# Patient Record
Sex: MALE | Race: WHITE | NOT HISPANIC OR LATINO | Employment: OTHER | URBAN - METROPOLITAN AREA
[De-identification: names, ages, dates, MRNs, and addresses within clinical notes are randomized per-mention and may not be internally consistent; named-entity substitution may affect disease eponyms.]

---

## 2019-05-04 RX ORDER — GABAPENTIN 800 MG/1
TABLET ORAL
Qty: 270 TABLET | Refills: 3 | Status: SHIPPED | OUTPATIENT
Start: 2019-05-04 | End: 2020-05-12 | Stop reason: SDUPTHER

## 2019-07-10 ENCOUNTER — TELEPHONE (OUTPATIENT)
Dept: FAMILY MEDICINE | Facility: CLINIC | Age: 74
End: 2019-07-10

## 2019-07-10 ENCOUNTER — OFFICE VISIT (OUTPATIENT)
Dept: FAMILY MEDICINE | Facility: CLINIC | Age: 74
End: 2019-07-10
Payer: MEDICARE

## 2019-07-10 VITALS
SYSTOLIC BLOOD PRESSURE: 104 MMHG | TEMPERATURE: 98 F | HEART RATE: 66 BPM | DIASTOLIC BLOOD PRESSURE: 65 MMHG | WEIGHT: 238 LBS | OXYGEN SATURATION: 98 %

## 2019-07-10 DIAGNOSIS — E78.5 HYPERLIPIDEMIA, UNSPECIFIED HYPERLIPIDEMIA TYPE: ICD-10-CM

## 2019-07-10 DIAGNOSIS — R63.8 DEHYDRATION SYMPTOMS: Primary | ICD-10-CM

## 2019-07-10 DIAGNOSIS — E11.40 TYPE 2 DIABETES MELLITUS WITH DIABETIC NEUROPATHY, WITHOUT LONG-TERM CURRENT USE OF INSULIN: ICD-10-CM

## 2019-07-10 DIAGNOSIS — M62.82 NON-TRAUMATIC RHABDOMYOLYSIS: Primary | ICD-10-CM

## 2019-07-10 PROBLEM — K64.9 HEMORRHOIDS: Status: ACTIVE | Noted: 2019-07-10

## 2019-07-10 PROBLEM — F41.9 ANXIETY: Status: ACTIVE | Noted: 2019-07-10

## 2019-07-10 PROBLEM — G60.9 IDIOPATHIC PERIPHERAL NEUROPATHY: Status: ACTIVE | Noted: 2019-07-10

## 2019-07-10 PROBLEM — I10 BENIGN ESSENTIAL HYPERTENSION: Status: ACTIVE | Noted: 2019-07-10

## 2019-07-10 PROBLEM — R35.1 NOCTURIA: Status: ACTIVE | Noted: 2019-07-10

## 2019-07-10 LAB
ABS NRBC COUNT: 0 X 10 3/UL (ref 0–0.01)
ABSOLUTE BASOPHIL: 0.03 X 10 3/UL (ref 0–0.22)
ABSOLUTE EOSINOPHIL: 0.13 X 10 3/UL (ref 0.04–0.54)
ABSOLUTE IMMATURE GRAN: 0.01 X 10 3/UL (ref 0–0.04)
ABSOLUTE LYMPHOCYTE: 2.51 X 10 3/UL (ref 0.86–4.75)
ABSOLUTE MONOCYTE: 0.6 X 10 3/UL (ref 0.22–1.08)
ALBUMIN SERPL-MCNC: 4.7 G/DL (ref 3.5–5.2)
ALBUMIN/GLOB SERPL ELPH: 1.6 {RATIO} (ref 1–2.7)
ALP ISOS SERPL LEV INH-CCNC: 71 IU/L (ref 40–130)
ALT (SGPT): 23 U/L (ref 0–41)
ANION GAP SERPL CALC-SCNC: 10 MMOL/L (ref 8–17)
AST SERPL-CCNC: 30 U/L (ref 0–40)
BASOPHILS NFR BLD: 0.5 %
BILIRUBIN, TOTAL: 0.72 MG/DL (ref 0–1.2)
BUN/CREAT SERPL: 25.4 (ref 6–20)
CALCIUM SERPL-MCNC: 9.3 MG/DL (ref 8.6–10.2)
CARBON DIOXIDE, CO2: 28 MMOL/L (ref 22–29)
CHLORIDE: 105 MMOL/L (ref 98–107)
CHOLEST SERPL-MSCNC: 133 MG/DL (ref 100–200)
CK SERPL-CCNC: 628 U/L (ref 39–308)
CREAT SERPL-MCNC: 1.18 MG/DL (ref 0.7–1.2)
EOSINOPHIL NFR BLD: 2.1 %
ESTIMATED AVERAGE GLUCOSE: 127 MG/DL
GFR ESTIMATION: 60.68
GLOBULIN: 3 G/DL (ref 1.5–4.5)
GLUCOSE: 116 MG/DL (ref 82–115)
HBA1C MFR BLD: 6.1 % (ref 4–6)
HCT VFR BLD AUTO: 42.5 % (ref 42–52)
HDLC SERPL-MCNC: 35 MG/DL
HGB BLD-MCNC: 14.5 G/DL (ref 14–18)
IMMATURE GRANULOCYTES: 0.2 % (ref 0–0.5)
LDL/HDL RATIO: 1.9 (ref 1–3)
LDLC SERPL CALC-MCNC: 67.2 MG/DL (ref 0–100)
LYMPHOCYTES NFR BLD: 41.1 %
MCH RBC QN AUTO: 31.7 PG (ref 27–32)
MCHC RBC AUTO-ENTMCNC: 34.1 G/DL (ref 32–36)
MCV RBC AUTO: 93 FL (ref 80–94)
MONOCYTES NFR BLD: 9.8 %
NEUTROPHILS ABSOLUTE COUNT: 2.83 X 10 3/UL (ref 2.15–7.56)
NEUTROPHILS NFR BLD: 46.3 %
NUCLEATED RED BLOOD CELLS: 0 /100 WBC (ref 0–0.2)
PLATELET # BLD AUTO: 236 X 10 3/UL (ref 135–400)
POTASSIUM: 4.3 MMOL/L (ref 3.5–5.1)
PROT SNV-MCNC: 7.7 G/DL (ref 6.4–8.3)
RBC # BLD AUTO: 4.57 X 10 6/UL (ref 4.7–6.1)
RDW-SD: 42.2 FL (ref 37–54)
SODIUM: 143 MMOL/L (ref 136–145)
TRIGL SERPL-MCNC: 154 MG/DL (ref 0–150)
UREA NITROGEN (BUN): 30 MG/DL (ref 8–23)
WBC # BLD: 6.11 X 10 3/UL (ref 4.3–10.8)

## 2019-07-10 PROCEDURE — 99213 PR OFFICE/OUTPT VISIT, EST, LEVL III, 20-29 MIN: ICD-10-PCS | Mod: S$GLB,,, | Performed by: NURSE PRACTITIONER

## 2019-07-10 PROCEDURE — 99213 OFFICE O/P EST LOW 20 MIN: CPT | Mod: S$GLB,,, | Performed by: NURSE PRACTITIONER

## 2019-07-10 RX ORDER — FLUOXETINE HYDROCHLORIDE 40 MG/1
CAPSULE ORAL
COMMUNITY
End: 2019-11-18 | Stop reason: SDUPTHER

## 2019-07-10 RX ORDER — ROPINIROLE 1 MG/1
TABLET, FILM COATED ORAL
COMMUNITY
End: 2019-09-14 | Stop reason: SDUPTHER

## 2019-07-10 RX ORDER — TAMSULOSIN HYDROCHLORIDE 0.4 MG/1
0.4 CAPSULE ORAL DAILY
COMMUNITY
End: 2019-07-18 | Stop reason: SDUPTHER

## 2019-07-10 RX ORDER — NAPROXEN SODIUM 220 MG/1
TABLET, FILM COATED ORAL
COMMUNITY

## 2019-07-10 RX ORDER — PRAVASTATIN SODIUM 80 MG/1
TABLET ORAL
COMMUNITY
End: 2019-08-06 | Stop reason: SDUPTHER

## 2019-07-10 RX ORDER — LOSARTAN POTASSIUM AND HYDROCHLOROTHIAZIDE 12.5; 5 MG/1; MG/1
TABLET ORAL
COMMUNITY
End: 2020-06-17 | Stop reason: ALTCHOICE

## 2019-07-10 RX ORDER — GLIMEPIRIDE 1 MG/1
TABLET ORAL
COMMUNITY
End: 2019-08-12 | Stop reason: SDUPTHER

## 2019-07-10 NOTE — TELEPHONE ENCOUNTER
Spoke with patient via telephone. Notified of abnormal lab results and cause. Instructed to hold statin. Encouraged oral hydration and rest. Repeat Ck prior to next appointmen on 07/18/19. Follow up as scheduled with Dr. Harris.    Critical :

## 2019-07-10 NOTE — PATIENT INSTRUCTIONS
Dehydration (Adult)  Dehydration occurs when your body loses too much fluid. This may be the result of prolonged vomiting or diarrhea, excessive sweating, or a high fever. It may also happen if you dont drink enough fluid when youre sick or out in the heat. Misuse of diuretics (water pills) can also be a cause.  Symptoms include thirst and decreased urine output. You may also feel dizzy, weak, fatigued, or very drowsy. The diet described below is usually enough to treat dehydration. In some cases, you may need medicine.  Home care  · Drink at least 12 8-ounce glasses of fluid every day to resolve the dehydration. Fluid may include water; orange juice; lemonade; apple, grape, or cranberry juice; clear fruit drinks; electrolyte replacement and sports drinks; and teas and coffee without caffeine. If you have been diagnosed with a kidney disease, ask your doctor how much and what types of fluids you should drink to prevent dehydration. If you have kidney disease, fluid can build up in the body. This can be dangerous to your health.  · If you have a fever, muscle aches, or a headache as a result of a cold or flu, you may take acetaminophen or ibuprofen, unless another medicine was prescribed. If you have chronic liver or kidney disease, or have ever had a stomach ulcer or gastrointestinal bleeding, talk with your health care provider before using these medicines. Don't take aspirin if you are younger than 18 and have a fever. Aspirin raises the chance for severe liver injury.  Follow-up care  Follow up with your health care provider, or as advised.  When to seek medical advice  Call your health care provider right away if any of these occur:  · Continued vomiting  · Frequent diarrhea (more than 5 times a day); blood (red or black color) or mucus in diarrhea  · Blood in vomit or stool  · Swollen abdomen or increasing abdominal pain  · Weakness, dizziness, or fainting  · Unusual drowsiness or confusion  · Reduced urine  output or extreme thirst  · Fever of 100.4°F (34°C) or higher  Date Last Reviewed: 5/31/2015  © 3035-1208 [employer]. [address] [place] [address], [place] [address]. All rights reserved. This information is not intended as a substitute for professional medical care. Always follow your healthcare professional's instructions.

## 2019-07-10 NOTE — PROGRESS NOTES
"Clinic Note  7/10/2019      Subjective:       Patient ID:  Maurice is a 74 y.o. male being seen for an established visit.    Chief Complaint: Loss of Consciousness (Pt c/o "blacking out" states that he loss all vision)    HPI   Maurice Sosa is a 74 y.o. male who presents for evaluation of near syncope. Onset was 2 days ago, Monday 07/08/19. States he has been outside all day in the heat working on [employer] to prepare [employer] for next season. States he remembers urinating only once on Monday when the first episode occurred. He experience one other less severe episode on Tuesday, 07/09/19. Patient describes the episode as never actually lost consciousness, had precursor symptoms only, including feeling of almost losing consciousness, greyed out vision. Associated symptoms: short of breath and intermittent muscular cramping of bilateral lower extermities. States that it feels as though he can not take a deep breath. The patient denies pain, fever,  nausea, vomiting, and tachycardia/palpitations. Medications putting patient at risk for syncope: diuretics, ARB, pravastatin.      Review of Systems   Constitutional: Positive for malaise/fatigue. Negative for chills, diaphoresis and fever.   HENT: Negative for congestion, ear discharge, ear pain and sinus pain.    Respiratory: Positive for shortness of breath. Negative for cough, sputum production and wheezing.    Cardiovascular: Negative for chest pain, palpitations and claudication.   Gastrointestinal: Negative for constipation, diarrhea, nausea and vomiting.   Genitourinary: Positive for frequency. Negative for dysuria and urgency.        Nocturia, chronic, stable with meds   Musculoskeletal: Positive for myalgias.         Intermittent muscular cramping of bilateral lower extremities x2 days   Neurological: Positive for dizziness and loss of consciousness.        Near syncopal episode x2 days       Medication List with Changes/Refills   Current Medications    " ASPIRIN 81 MG CHEW    aspirin 81 mg chewable tablet   Chew 1 tablet every day by oral route.    FLUOXETINE 40 MG CAPSULE    fluoxetine 40 mg capsule   TAKE 1 CAPSULE EVERY DAY    GABAPENTIN (NEURONTIN) 800 MG TABLET    TAKE 1 TABLET THREE TIMES DAILY    GLIMEPIRIDE (AMARYL) 1 MG TABLET    glimepiride 1 mg tablet   TAKE 1 TABLET EVERY DAY    LOSARTAN-HYDROCHLOROTHIAZIDE 50-12.5 MG (HYZAAR) 50-12.5 MG PER TABLET    losartan 50 mg-hydrochlorothiazide 12.5 mg tablet   TAKE 1 TABLET EVERY DAY    PRAVASTATIN (PRAVACHOL) 80 MG TABLET    pravastatin 80 mg tablet   TAKE 1 TABLET EVERY DAY    ROPINIROLE (REQUIP) 1 MG TABLET    ropinirole 1 mg tablet   TAKE 1 TABLET EVERY DAY AT BEDTIME    TAMSULOSIN (FLOMAX) 0.4 MG CAP    Take 0.4 mg by mouth once daily.       Patient Active Problem List   Diagnosis    Diabetic neuropathy    Benign essential hypertension    Hyperlipidemia    Hemorrhoids    Idiopathic peripheral neuropathy    Nocturia    Anxiety           Objective:      /65 (BP Location: Left arm, Patient Position: Sitting, BP Method: Large (Automatic))   Pulse 66   Temp 97.5 °F (36.4 °C) (Temporal)   Wt 108 kg (238 lb)   SpO2 98%   There is no height or weight on file to calculate BMI.         Physical Exam   Constitutional: He is oriented to person, place, and time and well-developed, well-nourished, and in no distress. No distress.   HENT:   Head: Normocephalic and atraumatic.   Left Ear: External ear normal.   Nose: Nose normal.   Mouth/Throat: Oropharynx is clear and moist.   Eyes: Conjunctivae and EOM are normal. Left eye exhibits no discharge. No scleral icterus.   Neck: Normal range of motion. Neck supple. No JVD present. No tracheal deviation present.   Cardiovascular: Normal rate, regular rhythm, normal heart sounds and intact distal pulses. Exam reveals no friction rub.   No murmur heard.  Musculoskeletal: Normal range of motion. He exhibits no edema, tenderness or deformity.   Neurological: He is  alert and oriented to person, place, and time. Gait normal. GCS score is 15.   Skin: Skin is warm. No rash noted. He is not diaphoretic. No erythema. No pallor.   Psychiatric: Mood, memory, affect and judgment normal.            Assessment and Plan:   Dehydration symptoms     Patient reassured of benign history and exam.  Lab per orders.  Encourage oral hydration: water, Gatorade, powerade   Call if s/s worsen or do not improve  Follow up with Dr. Harris as scheduled and as needed.      Problem List Items Addressed This Visit        Cardiac/Vascular    Hyperlipidemia    Relevant Orders    Lipid panel      Other Visit Diagnoses     Dehydration symptoms    -  Primary    Relevant Orders    Comprehensive metabolic panel    CK    CBC auto differential    Type 2 diabetes mellitus with diabetic neuropathy, without long-term current use of insulin        Relevant Medications    glimepiride (AMARYL) 1 MG tablet    Other Relevant Orders    Hemoglobin A1c          Follow Up:   Follow up if symptoms worsen or fail to improve.    Other Orders Placed This Visit:  Orders Placed This Encounter   Procedures    Comprehensive metabolic panel    CK    CBC auto differential    Hemoglobin A1c    Lipid panel         Cris Ricardo    Problem List Items Addressed This Visit        Cardiac/Vascular    Hyperlipidemia    Relevant Orders    Lipid panel      Other Visit Diagnoses     Dehydration symptoms    -  Primary    Relevant Orders    Comprehensive metabolic panel    CK    CBC auto differential    Type 2 diabetes mellitus with diabetic neuropathy, without long-term current use of insulin        Relevant Medications    glimepiride (AMARYL) 1 MG tablet    Other Relevant Orders    Hemoglobin A1c

## 2019-07-18 ENCOUNTER — OFFICE VISIT (OUTPATIENT)
Dept: FAMILY MEDICINE | Facility: CLINIC | Age: 74
End: 2019-07-18
Payer: MEDICARE

## 2019-07-18 VITALS
OXYGEN SATURATION: 97 % | TEMPERATURE: 98 F | DIASTOLIC BLOOD PRESSURE: 64 MMHG | SYSTOLIC BLOOD PRESSURE: 98 MMHG | HEART RATE: 62 BPM | WEIGHT: 242 LBS

## 2019-07-18 DIAGNOSIS — I10 BENIGN ESSENTIAL HYPERTENSION: ICD-10-CM

## 2019-07-18 DIAGNOSIS — E78.5 HYPERLIPIDEMIA, UNSPECIFIED HYPERLIPIDEMIA TYPE: ICD-10-CM

## 2019-07-18 DIAGNOSIS — E11.40 TYPE 2 DIABETES MELLITUS WITH DIABETIC NEUROPATHY, WITHOUT LONG-TERM CURRENT USE OF INSULIN: ICD-10-CM

## 2019-07-18 DIAGNOSIS — M62.82 NON-TRAUMATIC RHABDOMYOLYSIS: ICD-10-CM

## 2019-07-18 DIAGNOSIS — R42 DIZZINESS: Primary | ICD-10-CM

## 2019-07-18 LAB — CK SERPL-CCNC: 330 U/L (ref 39–308)

## 2019-07-18 PROCEDURE — 99214 OFFICE O/P EST MOD 30 MIN: CPT | Mod: S$GLB,,, | Performed by: FAMILY MEDICINE

## 2019-07-18 PROCEDURE — 99214 PR OFFICE/OUTPT VISIT, EST, LEVL IV, 30-39 MIN: ICD-10-PCS | Mod: S$GLB,,, | Performed by: FAMILY MEDICINE

## 2019-07-18 RX ORDER — TAMSULOSIN HYDROCHLORIDE 0.4 MG/1
0.4 CAPSULE ORAL DAILY
Qty: 90 CAPSULE | Refills: 3 | Status: SHIPPED | OUTPATIENT
Start: 2019-07-18 | End: 2020-06-17

## 2019-07-19 NOTE — PROGRESS NOTES
Subjective:       Patient ID: Maurice Sosa is a 74 y.o. male.    Chief Complaint: Follow-up    75 yo male in for follow up on diabetes, hypertension, anxiety, hyperlipidemia and BPH.  He states that he has been feeling dizzy but not as bad as before.  He came in complaining of feeling weak and having a lot of muscle pain and his CPK was high.  He states he is still feeling off and weak as if he will pass out and short of breath.      Review of Systems   Constitutional: Negative for fever.   HENT: Negative for ear pain, postnasal drip, rhinorrhea, sinus pain and sore throat.    Eyes: Negative for redness.   Respiratory: Negative for cough, chest tightness, shortness of breath and wheezing.    Cardiovascular: Negative for chest pain, palpitations and leg swelling.   Gastrointestinal: Negative for constipation, diarrhea, nausea and vomiting.   Genitourinary: Negative for difficulty urinating and dysuria.   Musculoskeletal: Negative for arthralgias.   Skin: Negative for rash.   Neurological: Positive for dizziness, weakness and light-headedness.       Objective:      Physical Exam   Constitutional: He is oriented to person, place, and time. He appears well-developed and well-nourished.   HENT:   Head: Normocephalic and atraumatic.   Eyes: Pupils are equal, round, and reactive to light. Conjunctivae and EOM are normal.   Neck: Normal range of motion. Neck supple.   Cardiovascular: Normal rate, regular rhythm and normal heart sounds.   Pulmonary/Chest: Breath sounds normal. He has no wheezes. He has no rales.   Abdominal: Soft. Bowel sounds are normal. He exhibits no distension and no mass. There is no tenderness. There is no guarding.   Musculoskeletal: Normal range of motion. He exhibits no edema or tenderness.   Neurological: He is alert and oriented to person, place, and time. No cranial nerve deficit.   Skin: Skin is warm and dry. No rash noted. No erythema.   Psychiatric: He has a normal mood and affect. His  behavior is normal.   Nursing note and vitals reviewed.      Assessment:       1. Dizziness    2. Non-traumatic rhabdomyolysis    3. Hyperlipidemia, unspecified hyperlipidemia type    4. Type 2 diabetes mellitus with diabetic neuropathy, without long-term current use of insulin    5. Benign essential hypertension        Plan:     Dizziness still present but not as bad as before but I will check an EKG today and revealed no significant changes.  CPK leval has come down to 330 from over 600's.  Patient to decreased the pravastatin to 40 mg and repeat CPK level in 3-4 weeks.  Diabetes is chronic on glimepiride.  Hypertension is chronic and controlled on losartan -hctz but may need to monitor blood pressure closely to monitor for orthostatic hypotension.

## 2019-08-06 RX ORDER — PRAVASTATIN SODIUM 80 MG/1
TABLET ORAL
Qty: 90 TABLET | Refills: 3 | Status: SHIPPED | OUTPATIENT
Start: 2019-08-06 | End: 2021-03-11 | Stop reason: DRUGHIGH

## 2019-08-12 RX ORDER — GLIMEPIRIDE 1 MG/1
TABLET ORAL
Qty: 90 TABLET | Refills: 3 | Status: SHIPPED | OUTPATIENT
Start: 2019-08-12 | End: 2020-07-13 | Stop reason: SDUPTHER

## 2019-08-29 ENCOUNTER — OFFICE VISIT (OUTPATIENT)
Dept: FAMILY MEDICINE | Facility: CLINIC | Age: 74
End: 2019-08-29
Payer: MEDICARE

## 2019-08-29 VITALS
BODY MASS INDEX: 33.65 KG/M2 | WEIGHT: 240.38 LBS | HEART RATE: 65 BPM | TEMPERATURE: 99 F | HEIGHT: 71 IN | DIASTOLIC BLOOD PRESSURE: 71 MMHG | OXYGEN SATURATION: 98 % | SYSTOLIC BLOOD PRESSURE: 113 MMHG

## 2019-08-29 DIAGNOSIS — E11.40 TYPE 2 DIABETES MELLITUS WITH DIABETIC NEUROPATHY, WITHOUT LONG-TERM CURRENT USE OF INSULIN: ICD-10-CM

## 2019-08-29 DIAGNOSIS — I10 BENIGN ESSENTIAL HYPERTENSION: ICD-10-CM

## 2019-08-29 DIAGNOSIS — E78.00 PURE HYPERCHOLESTEROLEMIA: ICD-10-CM

## 2019-08-29 DIAGNOSIS — E78.5 HYPERLIPIDEMIA, UNSPECIFIED HYPERLIPIDEMIA TYPE: Primary | ICD-10-CM

## 2019-08-29 PROCEDURE — 99213 OFFICE O/P EST LOW 20 MIN: CPT | Mod: S$GLB,,, | Performed by: FAMILY MEDICINE

## 2019-08-29 PROCEDURE — 99213 PR OFFICE/OUTPT VISIT, EST, LEVL III, 20-29 MIN: ICD-10-PCS | Mod: S$GLB,,, | Performed by: FAMILY MEDICINE

## 2019-08-29 NOTE — ASSESSMENT & PLAN NOTE
Repeat lipid panel today and if control he will continue to take 40 mg of pravastatin at this time.

## 2019-08-29 NOTE — PROGRESS NOTES
Clinic Note  8/29/2019      Subjective:       Patient ID:  Maurice is a 74 y.o. male being seen for an established visit.    Chief Complaint: Follow-up (dizzness)        Maurice Sosa is a 74 y.o. male here for follow up of dyslipidemia. The patient does use medications that may worsen dyslipidemias (corticosteroids, progestins, anabolic steroids, diuretics, beta-blockers, amiodarone, cyclosporine, olanzapine). The patient exercises intermittently.  The patient is not known to have coexisting coronary artery disease. He came in a couple months ago with complaint of excruciating muscle pain and feeling weak and a CPK level was checked and noted to be elevated.  Because of a concern for myositis due to statin usage his cholesterol medicine was decreased to 40 mg of pravastatin.  He did go to lab this morning to have his cholesterol checked again.  He reports that he has not had any symptoms since be cutting his medication down to 40 mg.      Hypertension: Patient here for follow-up of elevated blood pressure. He is not exercising and is adherent to low salt diet.  Blood pressure is well controlled at home. Cardiac symptoms none. Patient denies chest pain, dyspnea, near-syncope and palpitations.  Cardiovascular risk factors: diabetes mellitus, dyslipidemia, hypertension, male gender and obesity (BMI >= 30 kg/m2). Use of agents associated with hypertension: none. History of target organ damage: none.    Diabetes Mellitus  Patient presents for follow up of diabetes. Current symptoms include: none. Symptoms have been well-controlled and been basically asymptomatic. Patient denies foot ulcerations, hyperglycemia and hypoglycemia . Evaluation to date has included: hemoglobin A1C.  Home sugars: BGs consistently in an acceptable range. Current treatment: no recent interventions.           Review of Systems   Constitutional: Negative for malaise/fatigue and weight loss.   HENT: Negative.  Negative for sinus pain.    Eyes:  "Negative.    Respiratory: Negative.  Negative for cough, shortness of breath, wheezing and stridor.    Cardiovascular: Negative.  Negative for chest pain, palpitations and PND.   Gastrointestinal: Positive for diarrhea. Negative for abdominal pain.   Genitourinary: Negative.  Negative for dysuria and urgency.   Musculoskeletal: Negative.  Negative for back pain and myalgias.   Skin: Negative.    Neurological: Negative.  Negative for dizziness and weakness.   Endo/Heme/Allergies: Negative.    Psychiatric/Behavioral: Negative.        Medication List with Changes/Refills   Current Medications    ASPIRIN 81 MG CHEW    aspirin 81 mg chewable tablet   Chew 1 tablet every day by oral route.    FLUOXETINE 40 MG CAPSULE    fluoxetine 40 mg capsule   TAKE 1 CAPSULE EVERY DAY    GABAPENTIN (NEURONTIN) 800 MG TABLET    TAKE 1 TABLET THREE TIMES DAILY    GLIMEPIRIDE (AMARYL) 1 MG TABLET    TAKE 1 TABLET EVERY DAY    LOSARTAN-HYDROCHLOROTHIAZIDE 50-12.5 MG (HYZAAR) 50-12.5 MG PER TABLET    losartan 50 mg-hydrochlorothiazide 12.5 mg tablet   TAKE 1 TABLET EVERY DAY    PRAVASTATIN (PRAVACHOL) 80 MG TABLET    TAKE 1 TABLET EVERY DAY    ROPINIROLE (REQUIP) 1 MG TABLET    ropinirole 1 mg tablet   TAKE 1 TABLET EVERY DAY AT BEDTIME    TAMSULOSIN (FLOMAX) 0.4 MG CAP    Take 1 capsule (0.4 mg total) by mouth once daily.       Patient Active Problem List   Diagnosis    Diabetic neuropathy    Benign essential hypertension    Hyperlipidemia    Hemorrhoids    Idiopathic peripheral neuropathy    Nocturia    Anxiety    Non-traumatic rhabdomyolysis    Dizziness    Type 2 diabetes mellitus with diabetic neuropathy, without long-term current use of insulin           Objective:      /71 (BP Location: Left arm, Patient Position: Sitting, BP Method: Large (Automatic))   Pulse 65   Temp 99.2 °F (37.3 °C)   Ht 5' 11" (1.803 m)   Wt 109 kg (240 lb 6 oz)   SpO2 98%   BMI 33.53 kg/m²   Estimated body mass index is 33.53 kg/m² as " "calculated from the following:    Height as of this encounter: 5' 11" (1.803 m).    Weight as of this encounter: 109 kg (240 lb 6 oz).  Physical Exam   Constitutional: He is oriented to person, place, and time and well-developed, well-nourished, and in no distress.   HENT:   Head: Normocephalic and atraumatic.   Cardiovascular: Normal rate, regular rhythm and normal heart sounds.   Pulmonary/Chest: Effort normal and breath sounds normal.   Abdominal: Soft. Bowel sounds are normal.   Musculoskeletal: Normal range of motion.   Neurological: He is alert and oriented to person, place, and time.   Nursing note and vitals reviewed.        Assessment and Plan:         Problem List Items Addressed This Visit        Cardiac/Vascular    Hyperlipidemia - Primary    Current Assessment & Plan       Repeat lipid panel today and if control he will continue to take 40 mg of pravastatin at this time.         Relevant Orders    Lipid panel    CK    Benign essential hypertension    Current Assessment & Plan       Hypertension is chronic and well controlled on current meds.         Relevant Orders    Hemoglobin A1c    Comprehensive metabolic panel    CBC auto differential    TSH       Endocrine    Type 2 diabetes mellitus with diabetic neuropathy, without long-term current use of insulin    Relevant Orders    Hemoglobin A1c          Follow Up:   Follow up in about 3 months (around 11/29/2019).    Other Orders Placed This Visit:        Eugenia Harris       "

## 2019-08-30 LAB
CHOLEST SERPL-MSCNC: 136 MG/DL (ref 100–200)
HDLC SERPL-MCNC: 37 MG/DL
LDL/HDL RATIO: 1.9 (ref 1–3)
LDLC SERPL CALC-MCNC: 71.4 MG/DL (ref 0–100)
TRIGL SERPL-MCNC: 138 MG/DL (ref 0–150)

## 2019-09-15 RX ORDER — ROPINIROLE 1 MG/1
TABLET, FILM COATED ORAL
Qty: 90 TABLET | Refills: 3 | Status: SHIPPED | OUTPATIENT
Start: 2019-09-15 | End: 2020-10-15 | Stop reason: SDUPTHER

## 2019-09-18 ENCOUNTER — TELEPHONE (OUTPATIENT)
Dept: FAMILY MEDICINE | Facility: CLINIC | Age: 74
End: 2019-09-18

## 2019-09-18 DIAGNOSIS — R07.1 CHEST PAIN ON BREATHING: ICD-10-CM

## 2019-09-18 DIAGNOSIS — R55 NEAR SYNCOPE: Primary | ICD-10-CM

## 2019-09-18 NOTE — TELEPHONE ENCOUNTER
----- Message from Miranda Correa sent at 9/18/2019  9:13 AM CDT -----  Contact: patient  Went to ER on Monday. Had another fainting episode had to call the ambulance would like a referral to DR Lc Castano

## 2019-10-08 RX ORDER — ISOPROPYL ALCOHOL 0.75 G/1
SWAB TOPICAL
Qty: 100 EACH | Refills: 3 | Status: SHIPPED | OUTPATIENT
Start: 2019-10-08

## 2019-11-24 RX ORDER — FLUOXETINE HYDROCHLORIDE 40 MG/1
CAPSULE ORAL
Qty: 90 CAPSULE | Refills: 3 | Status: SHIPPED | OUTPATIENT
Start: 2019-11-24 | End: 2019-11-25 | Stop reason: SDUPTHER

## 2019-11-25 NOTE — TELEPHONE ENCOUNTER
----- Message from Rowan Villatoro sent at 11/25/2019  2:12 PM CST -----  Refill request    Fluoxetine 40mg    [facility]

## 2019-11-26 RX ORDER — FLUOXETINE HYDROCHLORIDE 40 MG/1
40 CAPSULE ORAL DAILY
Qty: 90 CAPSULE | Refills: 3 | Status: SHIPPED | OUTPATIENT
Start: 2019-11-26 | End: 2021-02-24 | Stop reason: SDUPTHER

## 2019-12-17 ENCOUNTER — OFFICE VISIT (OUTPATIENT)
Dept: FAMILY MEDICINE | Facility: CLINIC | Age: 74
End: 2019-12-17
Payer: MEDICARE

## 2019-12-17 VITALS
HEIGHT: 71 IN | SYSTOLIC BLOOD PRESSURE: 161 MMHG | OXYGEN SATURATION: 98 % | HEART RATE: 63 BPM | DIASTOLIC BLOOD PRESSURE: 84 MMHG | WEIGHT: 238 LBS | BODY MASS INDEX: 33.32 KG/M2 | TEMPERATURE: 98 F

## 2019-12-17 DIAGNOSIS — E78.00 PURE HYPERCHOLESTEROLEMIA: ICD-10-CM

## 2019-12-17 DIAGNOSIS — E11.40 TYPE 2 DIABETES MELLITUS WITH DIABETIC NEUROPATHY, WITHOUT LONG-TERM CURRENT USE OF INSULIN: ICD-10-CM

## 2019-12-17 DIAGNOSIS — J01.10 SUBACUTE FRONTAL SINUSITIS: ICD-10-CM

## 2019-12-17 DIAGNOSIS — I10 ESSENTIAL HYPERTENSION: ICD-10-CM

## 2019-12-17 PROCEDURE — 1159F PR MEDICATION LIST DOCUMENTED IN MEDICAL RECORD: ICD-10-PCS | Mod: S$GLB,,, | Performed by: FAMILY MEDICINE

## 2019-12-17 PROCEDURE — 96372 PR INJECTION,THERAP/PROPH/DIAG2ST, IM OR SUBCUT: ICD-10-PCS | Mod: S$GLB,,, | Performed by: FAMILY MEDICINE

## 2019-12-17 PROCEDURE — 96372 THER/PROPH/DIAG INJ SC/IM: CPT | Mod: S$GLB,,, | Performed by: FAMILY MEDICINE

## 2019-12-17 PROCEDURE — 1159F MED LIST DOCD IN RCRD: CPT | Mod: S$GLB,,, | Performed by: FAMILY MEDICINE

## 2019-12-17 PROCEDURE — 99214 PR OFFICE/OUTPT VISIT, EST, LEVL IV, 30-39 MIN: ICD-10-PCS | Mod: 25,S$GLB,, | Performed by: FAMILY MEDICINE

## 2019-12-17 PROCEDURE — 99214 OFFICE O/P EST MOD 30 MIN: CPT | Mod: 25,S$GLB,, | Performed by: FAMILY MEDICINE

## 2019-12-17 RX ORDER — BETAMETHASONE SODIUM PHOSPHATE AND BETAMETHASONE ACETATE 3; 3 MG/ML; MG/ML
6 INJECTION, SUSPENSION INTRA-ARTICULAR; INTRALESIONAL; INTRAMUSCULAR; SOFT TISSUE
Status: COMPLETED | OUTPATIENT
Start: 2019-12-17 | End: 2019-12-17

## 2019-12-17 RX ADMIN — BETAMETHASONE SODIUM PHOSPHATE AND BETAMETHASONE ACETATE 6 MG: 3; 3 INJECTION, SUSPENSION INTRA-ARTICULAR; INTRALESIONAL; INTRAMUSCULAR; SOFT TISSUE at 03:12

## 2019-12-17 NOTE — PROGRESS NOTES
Subjective:      Patient ID: Maurice Sosa is a 74 y.o. male.    Chief Complaint: Follow-up and Hyperlipidemia      Maurice Sosa is a 74 y.o. male here for follow up of dyslipidemia. The patient does use medications that may worsen dyslipidemias (corticosteroids, progestins, anabolic steroids, diuretics, beta-blockers, amiodarone, cyclosporine, olanzapine). The patient exercises infrequently.  The patient is known to have coexisting coronary artery disease.     Cardiac Risk Factors  Age > 45-male, > 55-female:  YES  +1  Smoking:   NO  Sig. family hx of CHD*:  YES  +1  Hypertension:   YES  +1  Diabetes:   YES  +1  HDL < 35:   YES  +1  HDL > 59:   NO  Total: 5    *- Sig. family h/o CHD per NCEP = MI or sudden death at <56yo in   father or other 1st-degree male relative, or <64yo in mother or   other 1st-degree female relative    Diabetes Mellitus  Patient presents for follow up of diabetes. Current symptoms include: none. Symptoms have been well-controlled. Patient denies foot ulcerations, hyperglycemia, hypoglycemia , increased appetite, nausea, paresthesia of the feet, polydipsia, polyuria, visual disturbances and vomiting. Evaluation to date has included: hemoglobin A1C.  Home sugars: BGs consistently in an acceptable range. Current treatment: no recent interventions.     Hypertension: Patient here for follow-up of elevated blood pressure. He is exercising and is adherent to low salt diet.  Blood pressure is well controlled at home. Cardiac symptoms none. Patient denies chest pain, dyspnea, palpitations and syncope.  Cardiovascular risk factors: advanced age (older than 55 for men, 65 for women), diabetes mellitus, dyslipidemia, hypertension and male gender. Use of agents associated with hypertension: none.  Patient was experiencing dizziness and a near syncopal episode and was seen by cardiology and he had a negative workup done on his heart.  He was told to stop the losartan-hctz. He has also been with  "sinus congestion and taking pseudoephedrine for it.     Review of Systems   Constitutional: Negative for fever.   HENT: Negative for ear pain, postnasal drip, rhinorrhea, sinus pain and sore throat.    Eyes: Negative for redness.   Respiratory: Negative for cough, chest tightness, shortness of breath and wheezing.    Cardiovascular: Negative for chest pain, palpitations and leg swelling.   Gastrointestinal: Negative for constipation, diarrhea, nausea and vomiting.   Genitourinary: Negative for difficulty urinating and dysuria.   Musculoskeletal: Negative for arthralgias.   Skin: Negative for rash.   Neurological: Negative for dizziness.     Medication List with Changes/Refills   Current Medications    ASPIRIN 81 MG CHEW    aspirin 81 mg chewable tablet   Chew 1 tablet every day by oral route.    BD ALCOHOL SWABS PADM    USE WHEN CHECKING BLOOD SUGAR ONE TIME DAILY    FLUOXETINE 40 MG CAPSULE    Take 1 capsule (40 mg total) by mouth once daily.    GABAPENTIN (NEURONTIN) 800 MG TABLET    TAKE 1 TABLET THREE TIMES DAILY    GLIMEPIRIDE (AMARYL) 1 MG TABLET    TAKE 1 TABLET EVERY DAY    LOSARTAN-HYDROCHLOROTHIAZIDE 50-12.5 MG (HYZAAR) 50-12.5 MG PER TABLET    losartan 50 mg-hydrochlorothiazide 12.5 mg tablet   TAKE 1 TABLET EVERY DAY    PRAVASTATIN (PRAVACHOL) 80 MG TABLET    TAKE 1 TABLET EVERY DAY    ROPINIROLE (REQUIP) 1 MG TABLET    TAKE 1 TABLET AT BEDTIME    TAMSULOSIN (FLOMAX) 0.4 MG CAP    Take 1 capsule (0.4 mg total) by mouth once daily.      Objective:   BP (!) 161/84 (BP Location: Left arm, Patient Position: Sitting, BP Method: Large (Automatic))   Pulse 63   Temp 98.1 °F (36.7 °C)   Ht 5' 11" (1.803 m)   Wt 108 kg (238 lb)   SpO2 98%   BMI 33.19 kg/m²    Estimated body mass index is 33.19 kg/m² as calculated from the following:    Height as of this encounter: 5' 11" (1.803 m).    Weight as of this encounter: 108 kg (238 lb).   Physical Exam   Constitutional: He is oriented to person, place, and time. " Vital signs are normal. He appears well-developed and well-nourished.   HENT:   Head: Normocephalic and atraumatic.   Right Ear: Hearing and tympanic membrane normal.   Left Ear: Hearing and tympanic membrane normal.   Nose: Nose normal.   Mouth/Throat: Uvula is midline, oropharynx is clear and moist and mucous membranes are normal.   Eyes: Pupils are equal, round, and reactive to light. Conjunctivae and EOM are normal.   Neck: Normal range of motion. Neck supple.   Cardiovascular: Normal rate, regular rhythm and normal heart sounds.   Pulmonary/Chest: Breath sounds normal. He has no wheezes. He has no rales.   Abdominal: Soft. Bowel sounds are normal. He exhibits no distension and no mass. There is no tenderness. There is no guarding.   Musculoskeletal: Normal range of motion. He exhibits no edema or tenderness.   Neurological: He is alert and oriented to person, place, and time. No cranial nerve deficit.   Skin: Skin is warm and dry. No rash noted. No erythema.   Psychiatric: He has a normal mood and affect. His speech is normal and behavior is normal. Judgment and thought content normal. Cognition and memory are normal.   Nursing note and vitals reviewed.    Lab Results   Component Value Date    WBC 6.11 07/10/2019    HGB 14.5 07/10/2019    HCT 42.5 07/10/2019     07/10/2019    CHOL 136 08/29/2019    TRIG 138 08/29/2019    HDL 37 (L) 08/29/2019    AST 30 07/10/2019     07/10/2019    K 4.3 07/10/2019     07/10/2019    CREATININE 1.18 07/10/2019    BUN 30.0 (H) 07/10/2019    CO2 28 07/10/2019    HGBA1C 6.1 (H) 07/10/2019      Assessment:      Problem List Items Addressed This Visit        ENT    Subacute frontal sinusitis    Relevant Medications    betamethasone acetate-betamethasone sodium phosphate injection 6 mg (Completed)       Cardiac/Vascular    Hyperlipidemia    Essential hypertension       Endocrine    Type 2 diabetes mellitus with diabetic neuropathy, without long-term current use of  insulin           Plan:   Hypertension is chronic and not well controlled because he was taken off his medication and he is taking a decongestant.  Hyperlipidemia is controlled on pravastatin 40 mg daily.  Diabetes type 2 is controlled on glimepiride 1 mg   Celestone 6 mg IM in clinic today for sinus congestion and he is to stop the decongestant.

## 2020-02-19 LAB — HBA1C MFR BLD: 6.1 % (ref 4–6)

## 2020-03-23 PROBLEM — J01.10 SUBACUTE FRONTAL SINUSITIS: Status: RESOLVED | Noted: 2019-12-17 | Resolved: 2020-03-23

## 2020-04-23 ENCOUNTER — PATIENT OUTREACH (OUTPATIENT)
Dept: ADMINISTRATIVE | Facility: HOSPITAL | Age: 75
End: 2020-04-23

## 2020-05-12 RX ORDER — GABAPENTIN 800 MG/1
800 TABLET ORAL 3 TIMES DAILY
Qty: 270 TABLET | Refills: 3 | Status: SHIPPED | OUTPATIENT
Start: 2020-05-12 | End: 2021-05-26 | Stop reason: SDUPTHER

## 2020-05-12 NOTE — TELEPHONE ENCOUNTER
----- Message from Angélica Pruitt sent at 5/12/2020  8:19 AM CDT -----  Contact: Patient   Patient calling for refill on meds    gabapentin (NEURONTIN) 800 MG tablet    [phone]. Tks

## 2020-06-17 ENCOUNTER — OFFICE VISIT (OUTPATIENT)
Dept: INTERNAL MEDICINE | Facility: CLINIC | Age: 75
End: 2020-06-17
Payer: MEDICARE

## 2020-06-17 VITALS
HEART RATE: 65 BPM | RESPIRATION RATE: 16 BRPM | OXYGEN SATURATION: 98 % | BODY MASS INDEX: 33.18 KG/M2 | WEIGHT: 237 LBS | TEMPERATURE: 98 F | DIASTOLIC BLOOD PRESSURE: 79 MMHG | SYSTOLIC BLOOD PRESSURE: 142 MMHG | HEIGHT: 71 IN

## 2020-06-17 DIAGNOSIS — E78.00 PURE HYPERCHOLESTEROLEMIA: ICD-10-CM

## 2020-06-17 DIAGNOSIS — I10 ESSENTIAL HYPERTENSION: Primary | ICD-10-CM

## 2020-06-17 DIAGNOSIS — E11.40 TYPE 2 DIABETES MELLITUS WITH DIABETIC NEUROPATHY, WITHOUT LONG-TERM CURRENT USE OF INSULIN: ICD-10-CM

## 2020-06-17 DIAGNOSIS — Z00.00 LABORATORY EXAMINATION ORDERED AS PART OF A ROUTINE GENERAL MEDICAL EXAMINATION: ICD-10-CM

## 2020-06-17 PROCEDURE — 99214 OFFICE O/P EST MOD 30 MIN: CPT | Mod: S$GLB,,, | Performed by: NURSE PRACTITIONER

## 2020-06-17 PROCEDURE — 99214 PR OFFICE/OUTPT VISIT, EST, LEVL IV, 30-39 MIN: ICD-10-PCS | Mod: S$GLB,,, | Performed by: NURSE PRACTITIONER

## 2020-06-17 RX ORDER — ACETAMINOPHEN, DIPHENHYDRAMINE HCL, PHENYLEPHRINE HCL 325; 25; 5 MG/1; MG/1; MG/1
1 TABLET ORAL NIGHTLY PRN
COMMUNITY

## 2020-06-17 RX ORDER — ACETAMINOPHEN 500 MG
1 TABLET ORAL DAILY
Qty: 30 CAPSULE | Refills: 5 | Status: SHIPPED | OUTPATIENT
Start: 2020-06-17 | End: 2020-12-10

## 2020-06-17 RX ORDER — ACETAMINOPHEN 500 MG
1 TABLET ORAL DAILY
COMMUNITY
End: 2020-06-17 | Stop reason: SDUPTHER

## 2020-06-17 NOTE — PROGRESS NOTES
Clinic Note  6/17/2020      Subjective:       Patient ID:  Maurice is a 75 y.o. male being seen for an established visit.    Chief Complaint: Insect Bite (back of neck) and Foot Pain (neuropathy... bilat feet pain  800mg gabapentin isnt working well enough (pain comes and goes))    HPI  Maurice Sosa is a 74 y.o. male here for follow up of dyslipidemia, hypertension, and diabetes.  Dyslipidemia  The patient does use medications that may worsen dyslipidemias (corticosteroids, progestins, anabolic steroids, diuretics, beta-blockers, amiodarone, cyclosporine, olanzapine). The patient exercises infrequently.  The patient is known to have coexisting coronary artery disease.      Cardiac Risk Factors  Age > 45-male, > 55-female:   YES  +1  Smoking:          NO  Sig. family hx of CHD*:            YES  +1  Hypertension:   YES  +1  Diabetes:          YES  +1  HDL < 35:         YES  +1  HDL > 59:         NO  Total:   5     *- Sig. family h/o CHD per NCEP = MI or sudden death at <56yo in   father or other 1st-degree male relative, or <66yo in mother or   other 1st-degree female relative     Diabetes Mellitus  Patient presents for follow up of diabetes. Current symptoms include: none. Symptoms have been well-controlled. Patient denies foot ulcerations, hyperglycemia, hypoglycemia , increased appetite, nausea, paresthesia of the feet, polydipsia, polyuria, visual disturbances and vomiting. Evaluation to date has included: hemoglobin A1C.  Home sugars: BGs consistently in an acceptable range. Current treatment: no recent interventions.      Hypertension: Patient here for follow-up of elevated blood pressure. He is exercising and is adherent to low salt diet.  Blood pressure is well controlled at home. Cardiac symptoms none. Patient denies chest pain, dyspnea, palpitations and syncope.  Cardiovascular risk factors: advanced age (older than 55 for men, 65 for women), diabetes mellitus, dyslipidemia, hypertension and male  gender. Use of agents associated with hypertension: none.  Patient was experiencing dizziness and a near syncopal episode and was seen by cardiology and he had a negative workup done on his heart.  He was told to stop the losartan-hctz. He has also been with sinus congestion and taking pseudoephedrine for it. He was taking medication for BP, Dr. ZOE Zaidi discontinued BP medication. States that his BP dropped to 70/40s, taken off medication per Cardiology. BP today 142/79.         The following portions of the patient's history were reviewed and updated as appropriate: allergies, current medications, past family history, past medical history, past social history, past surgical history and problem list.    Review of Systems   Constitutional: Negative for chills, fever, malaise/fatigue and weight loss.   HENT: Negative for congestion, ear pain, sinus pain and sore throat.    Respiratory: Negative for cough, sputum production, shortness of breath and wheezing.    Cardiovascular: Negative for chest pain, palpitations, claudication and leg swelling.   Gastrointestinal: Negative for abdominal pain, diarrhea, nausea and vomiting.   Genitourinary: Negative for dysuria, frequency, hematuria and urgency.   Skin: Positive for rash.        Insect bite to right side of abdomen   Neurological: Negative for dizziness, focal weakness, seizures, weakness and headaches.       Medication List with Changes/Refills   Current Medications    ASPIRIN 81 MG CHEW    aspirin 81 mg chewable tablet   Chew 1 tablet every day by oral route.    BD ALCOHOL SWABS PADM    USE WHEN CHECKING BLOOD SUGAR ONE TIME DAILY    FLUOXETINE 40 MG CAPSULE    Take 1 capsule (40 mg total) by mouth once daily.    GABAPENTIN (NEURONTIN) 800 MG TABLET    Take 1 tablet (800 mg total) by mouth 3 (three) times daily.    GLIMEPIRIDE (AMARYL) 1 MG TABLET    TAKE 1 TABLET EVERY DAY    MELATONIN 10 MG TAB    Take 1 tablet by mouth nightly as needed.    PRAVASTATIN  "(PRAVACHOL) 80 MG TABLET    TAKE 1 TABLET EVERY DAY    ROPINIROLE (REQUIP) 1 MG TABLET    TAKE 1 TABLET AT BEDTIME   Changed and/or Refilled Medications    Modified Medication Previous Medication    CHOLECALCIFEROL, VITAMIN D3, (VITAMIN D3) 50 MCG (2,000 UNIT) CAP cholecalciferol, vitamin D3, (VITAMIN D3) 50 mcg (2,000 unit) Cap       Take 1 capsule (2,000 Units total) by mouth once daily.    Take 1 capsule by mouth once daily.   Discontinued Medications    LOSARTAN-HYDROCHLOROTHIAZIDE 50-12.5 MG (HYZAAR) 50-12.5 MG PER TABLET    losartan 50 mg-hydrochlorothiazide 12.5 mg tablet   TAKE 1 TABLET EVERY DAY    TAMSULOSIN (FLOMAX) 0.4 MG CAP    Take 1 capsule (0.4 mg total) by mouth once daily.       Patient Active Problem List   Diagnosis    Diabetic neuropathy    Essential hypertension    Hyperlipidemia    Hemorrhoids    Idiopathic peripheral neuropathy    Nocturia    Anxiety    Non-traumatic rhabdomyolysis    Dizziness    Type 2 diabetes mellitus with diabetic neuropathy, without long-term current use of insulin           Objective:      BP (!) 142/79 (BP Location: Left arm, Patient Position: Sitting, BP Method: Large (Automatic))   Pulse 65   Temp 98.4 °F (36.9 °C) (Temporal)   Resp 16   Ht 5' 11" (1.803 m)   Wt 107.5 kg (237 lb)   SpO2 98%   BMI 33.05 kg/m²   Estimated body mass index is 33.05 kg/m² as calculated from the following:    Height as of this encounter: 5' 11" (1.803 m).    Weight as of this encounter: 107.5 kg (237 lb).  Physical Exam   Constitutional: He is oriented to person, place, and time and well-developed, well-nourished, and in no distress. He appears healthy. He does not have a sickly appearance. No distress.   HENT:   Head: Normocephalic and atraumatic.   Right Ear: External ear normal.   Left Ear: External ear normal.   Mouth/Throat: No oropharyngeal exudate.   Cardiovascular: Normal rate, regular rhythm and normal heart sounds. Exam reveals no friction rub.   No murmur " heard.  Pulmonary/Chest: Effort normal and breath sounds normal. No accessory muscle usage. No respiratory distress. He has no decreased breath sounds. He has no wheezes. He has no rales.   Neurological: He is alert and oriented to person, place, and time. Gait normal. GCS score is 15.   Skin: Skin is warm and dry. Rash noted. Rash is vesicular. He is not diaphoretic.        Psychiatric: Mood, memory, affect and judgment normal.   Nursing note and vitals reviewed.        Assessment and Plan:   Hypertension, chronic, stable  Currently not on medication, discontinued by Cardiology  Will monitor and record BP, bring readings to next visit  Hyperlipidemia is controlled on pravastatin 40 mg daily.  Diabetes type 2 is controlled on glimepiride 1 mg   Labs with next visit  Follow up in 3 months and as needed      Problem List Items Addressed This Visit        Cardiac/Vascular    Essential hypertension - Primary    Relevant Orders    CBC auto differential    Comprehensive metabolic panel    Hyperlipidemia    Relevant Orders    Lipid Panel       Endocrine    Type 2 diabetes mellitus with diabetic neuropathy, without long-term current use of insulin    Relevant Orders    Comprehensive metabolic panel    Hemoglobin A1C      Other Visit Diagnoses     Laboratory examination ordered as part of a routine general medical examination        Relevant Orders    CBC auto differential    Comprehensive metabolic panel    Lipid Panel    Hemoglobin A1C    TSH w/reflex to FT4            Risks, benefits, and alternatives discussed with patient, Patient verbalized understanding of discussed plan of care. Asked patient if any further questions, answered no.  Follow Up:   Follow up in about 3 months (around 9/17/2020), or if symptoms worsen or fail to improve.    Other Orders Placed This Visit:  Orders Placed This Encounter   Procedures    CBC auto differential    Comprehensive metabolic panel    Lipid Panel    Hemoglobin A1C    TSH  w/reflex to FT4         Cris Ricardo    Problem List Items Addressed This Visit        Cardiac/Vascular    Essential hypertension - Primary    Relevant Orders    CBC auto differential    Comprehensive metabolic panel    Hyperlipidemia    Relevant Orders    Lipid Panel       Endocrine    Type 2 diabetes mellitus with diabetic neuropathy, without long-term current use of insulin    Relevant Orders    Comprehensive metabolic panel    Hemoglobin A1C      Other Visit Diagnoses     Laboratory examination ordered as part of a routine general medical examination        Relevant Orders    CBC auto differential    Comprehensive metabolic panel    Lipid Panel    Hemoglobin A1C    TSH w/reflex to FT4

## 2020-07-13 RX ORDER — GLIMEPIRIDE 1 MG/1
1 TABLET ORAL DAILY
Qty: 90 TABLET | Refills: 3 | Status: SHIPPED | OUTPATIENT
Start: 2020-07-13 | End: 2021-06-07 | Stop reason: SDUPTHER

## 2020-07-13 NOTE — TELEPHONE ENCOUNTER
----- Message from Adela Bustos sent at 7/13/2020  4:12 PM CDT -----  Contact: Pt  Type:  RX Refill Request    Who Called:  pt   Refill or New Rx:refill   RX Name and Strength:glimepiride 1 mg   How is the patient currently taking it? (ex. 1XDay):once daily   Is this a 30 day or 90 day RX: 90 days   Preferred Pharmacy with phone number:[facility] mail order  Local or Mail Order: mail order  Ordering Provider anthony frost   Would the patient rather a call back or a response via [facility]? Phone   Best Call Back Number: [phone]  Additional Information:     [facility] - [place], [place] - [address]  [address] [address]  Phone: [phone] Fax: [phone]

## 2020-10-15 RX ORDER — ROPINIROLE 1 MG/1
1 TABLET, FILM COATED ORAL NIGHTLY
Qty: 90 TABLET | Refills: 3 | Status: SHIPPED | OUTPATIENT
Start: 2020-10-15 | End: 2020-10-21 | Stop reason: SDUPTHER

## 2020-10-15 NOTE — TELEPHONE ENCOUNTER
----- Message from Linda Low sent at 10/15/2020 11:56 AM CDT -----  .Type:  RX Refill Request    Who Called:  Kirsty ( [facility] mail order )   Refill or New Rx:refill   RX Name and Strength: rOPINIRole (REQUIP) 1 MG tablet  How is the patient currently taking it? (ex. 1XDay):  Is this a 30 day or 90 day RX:  Preferred Pharmacy with phone number:   [facility] - [facility] [address]  [address] [address]  Phone: [phone] Fax: [phone]          Local or Mail Order: local   Ordering Provider: anthony frost  Would the patient rather a call back or a response via [facility]? call  Best Call Back Number: [phone]  Additional Information: n/a

## 2020-10-22 RX ORDER — ROPINIROLE 1 MG/1
1 TABLET, FILM COATED ORAL NIGHTLY
Qty: 90 TABLET | Refills: 3 | Status: SHIPPED | OUTPATIENT
Start: 2020-10-22 | End: 2021-06-07 | Stop reason: SDUPTHER

## 2020-12-10 RX ORDER — ACETAMINOPHEN 500 MG
TABLET ORAL
Qty: 90 CAPSULE | Refills: 3 | Status: SHIPPED | OUTPATIENT
Start: 2020-12-10 | End: 2021-02-24

## 2021-02-24 DIAGNOSIS — F41.9 ANXIETY: Primary | ICD-10-CM

## 2021-02-24 RX ORDER — FLUOXETINE HYDROCHLORIDE 40 MG/1
40 CAPSULE ORAL DAILY
Qty: 90 CAPSULE | Refills: 3 | Status: SHIPPED | OUTPATIENT
Start: 2021-02-24 | End: 2021-06-07 | Stop reason: SDUPTHER

## 2021-02-24 RX ORDER — ACETAMINOPHEN 500 MG
TABLET ORAL
Qty: 90 CAPSULE | Refills: 3 | Status: SHIPPED | OUTPATIENT
Start: 2021-02-24

## 2021-03-04 ENCOUNTER — TELEPHONE (OUTPATIENT)
Dept: FAMILY MEDICINE | Facility: CLINIC | Age: 76
End: 2021-03-04

## 2021-03-08 DIAGNOSIS — I10 ESSENTIAL HYPERTENSION: Primary | ICD-10-CM

## 2021-03-08 DIAGNOSIS — Z79.899 LONG-TERM USE OF HIGH-RISK MEDICATION: ICD-10-CM

## 2021-03-08 DIAGNOSIS — E11.40 TYPE 2 DIABETES MELLITUS WITH DIABETIC NEUROPATHY, WITHOUT LONG-TERM CURRENT USE OF INSULIN: ICD-10-CM

## 2021-03-08 DIAGNOSIS — E78.00 PURE HYPERCHOLESTEROLEMIA: ICD-10-CM

## 2021-03-08 LAB
ABS NRBC COUNT: 0 X 10 3/UL (ref 0–0.01)
ABSOLUTE BASOPHIL: 0.05 X 10 3/UL (ref 0–0.22)
ABSOLUTE EOSINOPHIL: 0.14 X 10 3/UL (ref 0.04–0.54)
ABSOLUTE IMMATURE GRAN: 0.01 X 10 3/UL (ref 0–0.04)
ABSOLUTE LYMPHOCYTE: 2.79 X 10 3/UL (ref 0.86–4.75)
ABSOLUTE MONOCYTE: 0.74 X 10 3/UL (ref 0.22–1.08)
ALBUMIN SERPL-MCNC: 4.5 G/DL (ref 3.5–5.2)
ALBUMIN/GLOB SERPL ELPH: 1.6 {RATIO} (ref 1–2.7)
ALP ISOS SERPL LEV INH-CCNC: 94 U/L (ref 40–130)
ALT (SGPT): 16 U/L (ref 0–41)
ANION GAP SERPL CALC-SCNC: 5 MMOL/L (ref 8–17)
AST SERPL-CCNC: 18 U/L (ref 0–40)
BASOPHILS NFR BLD: 0.8 % (ref 0.2–1.2)
BILIRUBIN, TOTAL: 0.31 MG/DL (ref 0–1.2)
BUN/CREAT SERPL: 15.9 (ref 6–20)
CALCIUM SERPL-MCNC: 8.8 MG/DL (ref 8.6–10.2)
CARBON DIOXIDE, CO2: 30 MMOL/L (ref 22–29)
CHLORIDE: 105 MMOL/L (ref 98–107)
CHOLEST SERPL-MSCNC: 150 MG/DL (ref 100–200)
CREAT SERPL-MCNC: 1.35 MG/DL (ref 0.7–1.2)
EOSINOPHIL NFR BLD: 2.2 % (ref 0.7–7)
ESTIMATED AVERAGE GLUCOSE: 134 MG/DL
GFR ESTIMATION: 51.81
GLOBULIN: 2.8 G/DL (ref 1.5–4.5)
GLUCOSE: 121 MG/DL (ref 82–115)
HBA1C MFR BLD: 6.3 % (ref 4–6)
HCT VFR BLD AUTO: 45.2 % (ref 42–52)
HDLC SERPL-MCNC: 35 MG/DL
HGB BLD-MCNC: 14.9 G/DL (ref 14–18)
IMMATURE GRANULOCYTES: 0.2 % (ref 0–0.5)
LDL/HDL RATIO: 2.3 (ref 1–3)
LDLC SERPL CALC-MCNC: 80.2 MG/DL (ref 0–100)
LYMPHOCYTES NFR BLD: 44 % (ref 19.3–53.1)
MCH RBC QN AUTO: 30.8 PG (ref 27–32)
MCHC RBC AUTO-ENTMCNC: 33 G/DL (ref 32–36)
MCV RBC AUTO: 93.4 FL (ref 80–94)
MONOCYTES NFR BLD: 11.7 % (ref 4.7–12.5)
NEUTROPHILS # BLD AUTO: 2.61 X 10 3/UL (ref 2.15–7.56)
NEUTROPHILS NFR BLD: 41.1 % (ref 34–71.1)
NUCLEATED RED BLOOD CELLS: 0 /100 WBC (ref 0–0.2)
PLATELET # BLD AUTO: 232 X 10 3/UL (ref 135–400)
POTASSIUM: 4.6 MMOL/L (ref 3.5–5.1)
PROT SNV-MCNC: 7.3 G/DL (ref 6.4–8.3)
RBC # BLD AUTO: 4.84 X 10 6/UL (ref 4.7–6.1)
RDW-SD: 42 FL (ref 37–54)
SODIUM: 140 MMOL/L (ref 136–145)
T4, FREE: 1.11 NG/DL (ref 0.93–1.7)
TRIGL SERPL-MCNC: 174 MG/DL (ref 0–150)
TSH W/REFLEX TO FT4: 6.27 UIU/ML (ref 0.27–4.2)
UREA NITROGEN (BUN): 21.4 MG/DL (ref 8–23)
WBC # BLD: 6.34 X 10 3/UL (ref 4.3–10.8)

## 2021-03-11 ENCOUNTER — OFFICE VISIT (OUTPATIENT)
Dept: FAMILY MEDICINE | Facility: CLINIC | Age: 76
End: 2021-03-11
Payer: MEDICARE

## 2021-03-11 VITALS
HEART RATE: 63 BPM | DIASTOLIC BLOOD PRESSURE: 95 MMHG | WEIGHT: 251 LBS | TEMPERATURE: 98 F | SYSTOLIC BLOOD PRESSURE: 156 MMHG | OXYGEN SATURATION: 97 % | BODY MASS INDEX: 35.14 KG/M2 | HEIGHT: 71 IN

## 2021-03-11 DIAGNOSIS — E11.40 TYPE 2 DIABETES MELLITUS WITH DIABETIC NEUROPATHY, WITHOUT LONG-TERM CURRENT USE OF INSULIN: ICD-10-CM

## 2021-03-11 DIAGNOSIS — E78.00 PURE HYPERCHOLESTEROLEMIA: ICD-10-CM

## 2021-03-11 DIAGNOSIS — I10 ESSENTIAL HYPERTENSION: Primary | ICD-10-CM

## 2021-03-11 PROCEDURE — 99214 PR OFFICE/OUTPT VISIT, EST, LEVL IV, 30-39 MIN: ICD-10-PCS | Mod: S$GLB,,, | Performed by: NURSE PRACTITIONER

## 2021-03-11 PROCEDURE — 99214 OFFICE O/P EST MOD 30 MIN: CPT | Mod: S$GLB,,, | Performed by: NURSE PRACTITIONER

## 2021-03-11 RX ORDER — AMLODIPINE BESYLATE 5 MG/1
5 TABLET ORAL DAILY
Qty: 90 TABLET | Refills: 1 | Status: SHIPPED | OUTPATIENT
Start: 2021-03-11 | End: 2021-03-11 | Stop reason: SDUPTHER

## 2021-03-11 RX ORDER — PRAVASTATIN SODIUM 40 MG/1
40 TABLET ORAL DAILY
Qty: 90 TABLET | Refills: 3 | Status: SHIPPED | OUTPATIENT
Start: 2021-03-11 | End: 2021-06-07 | Stop reason: SDUPTHER

## 2021-03-11 RX ORDER — PRAVASTATIN SODIUM 40 MG/1
40 TABLET ORAL DAILY
Qty: 90 TABLET | Refills: 3 | Status: SHIPPED | OUTPATIENT
Start: 2021-03-11 | End: 2021-03-11 | Stop reason: SDUPTHER

## 2021-03-11 RX ORDER — AMLODIPINE BESYLATE 5 MG/1
5 TABLET ORAL DAILY
Qty: 90 TABLET | Refills: 1 | Status: SHIPPED | OUTPATIENT
Start: 2021-03-11 | End: 2021-06-07 | Stop reason: SDUPTHER

## 2021-05-26 DIAGNOSIS — G60.9 IDIOPATHIC PERIPHERAL NEUROPATHY: Primary | ICD-10-CM

## 2021-05-26 RX ORDER — GABAPENTIN 800 MG/1
800 TABLET ORAL 3 TIMES DAILY
Qty: 270 TABLET | Refills: 0 | Status: SHIPPED | OUTPATIENT
Start: 2021-05-26 | End: 2021-06-07

## 2021-06-07 ENCOUNTER — OFFICE VISIT (OUTPATIENT)
Dept: FAMILY MEDICINE | Facility: CLINIC | Age: 76
End: 2021-06-07
Payer: MEDICARE

## 2021-06-07 VITALS
TEMPERATURE: 98 F | HEIGHT: 71 IN | HEART RATE: 60 BPM | SYSTOLIC BLOOD PRESSURE: 138 MMHG | OXYGEN SATURATION: 96 % | DIASTOLIC BLOOD PRESSURE: 64 MMHG | BODY MASS INDEX: 34.44 KG/M2 | WEIGHT: 246 LBS

## 2021-06-07 DIAGNOSIS — E78.00 PURE HYPERCHOLESTEROLEMIA: ICD-10-CM

## 2021-06-07 DIAGNOSIS — E11.40 TYPE 2 DIABETES MELLITUS WITH DIABETIC NEUROPATHY, WITHOUT LONG-TERM CURRENT USE OF INSULIN: Primary | ICD-10-CM

## 2021-06-07 DIAGNOSIS — K63.5 POLYP OF COLON, UNSPECIFIED PART OF COLON, UNSPECIFIED TYPE: ICD-10-CM

## 2021-06-07 DIAGNOSIS — R42 DIZZINESS: ICD-10-CM

## 2021-06-07 DIAGNOSIS — F41.9 ANXIETY: ICD-10-CM

## 2021-06-07 DIAGNOSIS — Z23 IMMUNIZATION DUE: ICD-10-CM

## 2021-06-07 DIAGNOSIS — G25.81 RLS (RESTLESS LEGS SYNDROME): ICD-10-CM

## 2021-06-07 DIAGNOSIS — Z79.899 ON LONG TERM DRUG THERAPY: ICD-10-CM

## 2021-06-07 DIAGNOSIS — G60.9 IDIOPATHIC PERIPHERAL NEUROPATHY: ICD-10-CM

## 2021-06-07 DIAGNOSIS — I10 ESSENTIAL HYPERTENSION: ICD-10-CM

## 2021-06-07 LAB — GLUCOSE SERPL-MCNC: 110 MG/DL (ref 70–110)

## 2021-06-07 PROCEDURE — 90732 PPSV23 VACC 2 YRS+ SUBQ/IM: CPT | Mod: S$GLB,,, | Performed by: FAMILY MEDICINE

## 2021-06-07 PROCEDURE — 82962 POCT GLUCOSE, HAND-HELD DEVICE: ICD-10-PCS | Mod: ,,, | Performed by: FAMILY MEDICINE

## 2021-06-07 PROCEDURE — 99214 OFFICE O/P EST MOD 30 MIN: CPT | Mod: 25,S$GLB,, | Performed by: FAMILY MEDICINE

## 2021-06-07 PROCEDURE — G0009 PNEUMOCOCCAL POLYSACCHARIDE VACCINE 23-VALENT =>2YO SQ IM: ICD-10-PCS | Mod: S$GLB,,, | Performed by: FAMILY MEDICINE

## 2021-06-07 PROCEDURE — 82962 GLUCOSE BLOOD TEST: CPT | Mod: ,,, | Performed by: FAMILY MEDICINE

## 2021-06-07 PROCEDURE — G0009 ADMIN PNEUMOCOCCAL VACCINE: HCPCS | Mod: S$GLB,,, | Performed by: FAMILY MEDICINE

## 2021-06-07 PROCEDURE — 90732 PNEUMOCOCCAL POLYSACCHARIDE VACCINE 23-VALENT =>2YO SQ IM: ICD-10-PCS | Mod: S$GLB,,, | Performed by: FAMILY MEDICINE

## 2021-06-07 PROCEDURE — 99214 PR OFFICE/OUTPT VISIT, EST, LEVL IV, 30-39 MIN: ICD-10-PCS | Mod: 25,S$GLB,, | Performed by: FAMILY MEDICINE

## 2021-06-07 RX ORDER — FLUOXETINE HYDROCHLORIDE 40 MG/1
40 CAPSULE ORAL DAILY
Qty: 90 CAPSULE | Refills: 3 | Status: SHIPPED | OUTPATIENT
Start: 2021-06-07 | End: 2021-12-09 | Stop reason: SDUPTHER

## 2021-06-07 RX ORDER — AMLODIPINE BESYLATE 5 MG/1
5 TABLET ORAL DAILY
Qty: 90 TABLET | Refills: 1 | Status: SHIPPED | OUTPATIENT
Start: 2021-06-07 | End: 2021-12-01

## 2021-06-07 RX ORDER — PRAVASTATIN SODIUM 40 MG/1
40 TABLET ORAL DAILY
Qty: 90 TABLET | Refills: 3 | Status: SHIPPED | OUTPATIENT
Start: 2021-06-07 | End: 2021-12-09 | Stop reason: SDUPTHER

## 2021-06-07 RX ORDER — PREGABALIN 75 MG/1
75 CAPSULE ORAL 2 TIMES DAILY
Qty: 60 CAPSULE | Refills: 6 | Status: SHIPPED | OUTPATIENT
Start: 2021-06-07 | End: 2021-08-05 | Stop reason: SDUPTHER

## 2021-06-07 RX ORDER — GLIMEPIRIDE 1 MG/1
1 TABLET ORAL DAILY
Qty: 90 TABLET | Refills: 3 | Status: SHIPPED | OUTPATIENT
Start: 2021-06-07 | End: 2021-12-09 | Stop reason: SDUPTHER

## 2021-06-07 RX ORDER — ROPINIROLE 1 MG/1
1 TABLET, FILM COATED ORAL NIGHTLY
Qty: 90 TABLET | Refills: 3 | Status: SHIPPED | OUTPATIENT
Start: 2021-06-07 | End: 2021-12-09 | Stop reason: SDUPTHER

## 2021-06-09 LAB
ALBUMIN SERPL-MCNC: 4.6 G/DL (ref 3.5–5.2)
ALBUMIN/GLOB SERPL ELPH: 1.7 {RATIO} (ref 1–2.7)
ALP ISOS SERPL LEV INH-CCNC: 93 U/L (ref 40–130)
ALT (SGPT): 26 U/L (ref 0–41)
ANION GAP SERPL CALC-SCNC: 7 MMOL/L (ref 8–17)
AST SERPL-CCNC: 28 U/L (ref 0–40)
BILIRUBIN, TOTAL: 0.48 MG/DL (ref 0–1.2)
BUN/CREAT SERPL: 16.9 (ref 6–20)
CALCIUM SERPL-MCNC: 9.4 MG/DL (ref 8.6–10.2)
CARBON DIOXIDE, CO2: 28 MMOL/L (ref 22–29)
CHLORIDE: 102 MMOL/L (ref 98–107)
CREAT SERPL-MCNC: 1.08 MG/DL (ref 0.7–1.2)
DATE/TIME: NORMAL
GFR ESTIMATION: 66.84
GLOBULIN: 2.7 G/DL (ref 1.5–4.5)
GLUCOSE: 83 MG/DL (ref 82–115)
LAB PERSONNEL: NORMAL
PERSON NOTIFIED/TITLE: NORMAL
POTASSIUM: 4.5 MMOL/L (ref 3.5–5.1)
PROT SNV-MCNC: 7.3 G/DL (ref 6.4–8.3)
REASON FOR REJECTION: NORMAL
RESOLUTION: NORMAL
SODIUM: 137 MMOL/L (ref 136–145)
TEST UNABLE TO PERFORM: NORMAL
UREA NITROGEN (BUN): 18.2 MG/DL (ref 8–23)

## 2021-06-10 ENCOUNTER — CLINICAL SUPPORT (OUTPATIENT)
Dept: FAMILY MEDICINE | Facility: CLINIC | Age: 76
End: 2021-06-10
Payer: MEDICARE

## 2021-06-15 ENCOUNTER — PATIENT OUTREACH (OUTPATIENT)
Dept: ADMINISTRATIVE | Facility: HOSPITAL | Age: 76
End: 2021-06-15

## 2021-07-26 DIAGNOSIS — G60.9 IDIOPATHIC PERIPHERAL NEUROPATHY: ICD-10-CM

## 2021-07-26 RX ORDER — PREGABALIN 75 MG/1
75 CAPSULE ORAL 2 TIMES DAILY
Qty: 60 CAPSULE | Refills: 6 | OUTPATIENT
Start: 2021-07-26 | End: 2022-01-24

## 2021-08-05 ENCOUNTER — OFFICE VISIT (OUTPATIENT)
Dept: FAMILY MEDICINE | Facility: CLINIC | Age: 76
End: 2021-08-05
Payer: MEDICARE

## 2021-08-05 VITALS
RESPIRATION RATE: 18 BRPM | HEIGHT: 71 IN | DIASTOLIC BLOOD PRESSURE: 74 MMHG | WEIGHT: 239 LBS | HEART RATE: 74 BPM | BODY MASS INDEX: 33.46 KG/M2 | OXYGEN SATURATION: 98 % | SYSTOLIC BLOOD PRESSURE: 130 MMHG | TEMPERATURE: 98 F

## 2021-08-05 DIAGNOSIS — G60.9 IDIOPATHIC PERIPHERAL NEUROPATHY: ICD-10-CM

## 2021-08-05 DIAGNOSIS — E11.40 TYPE 2 DIABETES MELLITUS WITH DIABETIC NEUROPATHY, WITHOUT LONG-TERM CURRENT USE OF INSULIN: ICD-10-CM

## 2021-08-05 DIAGNOSIS — R91.8 BILATERAL PULMONARY INFILTRATES ON CHEST X-RAY: Primary | ICD-10-CM

## 2021-08-05 PROCEDURE — 99214 OFFICE O/P EST MOD 30 MIN: CPT | Mod: S$GLB,,, | Performed by: FAMILY MEDICINE

## 2021-08-05 PROCEDURE — 82962 POCT GLUCOSE, HAND-HELD DEVICE: ICD-10-PCS | Mod: ,,, | Performed by: FAMILY MEDICINE

## 2021-08-05 PROCEDURE — 99214 PR OFFICE/OUTPT VISIT, EST, LEVL IV, 30-39 MIN: ICD-10-PCS | Mod: S$GLB,,, | Performed by: FAMILY MEDICINE

## 2021-08-05 PROCEDURE — 82962 GLUCOSE BLOOD TEST: CPT | Mod: ,,, | Performed by: FAMILY MEDICINE

## 2021-08-05 RX ORDER — PREGABALIN 75 MG/1
75 CAPSULE ORAL 2 TIMES DAILY
Qty: 180 CAPSULE | Refills: 1 | Status: SHIPPED | OUTPATIENT
Start: 2021-08-05 | End: 2021-12-09 | Stop reason: SDUPTHER

## 2021-08-06 LAB — GLUCOSE SERPL-MCNC: 79 MG/DL (ref 70–110)

## 2021-09-09 ENCOUNTER — OFFICE VISIT (OUTPATIENT)
Dept: FAMILY MEDICINE | Facility: CLINIC | Age: 76
End: 2021-09-09
Payer: MEDICARE

## 2021-09-09 VITALS
TEMPERATURE: 98 F | HEIGHT: 71 IN | OXYGEN SATURATION: 99 % | BODY MASS INDEX: 33.32 KG/M2 | DIASTOLIC BLOOD PRESSURE: 78 MMHG | HEART RATE: 66 BPM | WEIGHT: 238 LBS | SYSTOLIC BLOOD PRESSURE: 144 MMHG

## 2021-09-09 DIAGNOSIS — U07.1 COVID-19: ICD-10-CM

## 2021-09-09 DIAGNOSIS — R91.1 LUNG NODULE: ICD-10-CM

## 2021-09-09 DIAGNOSIS — E11.40 TYPE 2 DIABETES MELLITUS WITH DIABETIC NEUROPATHY, WITHOUT LONG-TERM CURRENT USE OF INSULIN: Primary | ICD-10-CM

## 2021-09-09 PROCEDURE — 99499 UNLISTED E&M SERVICE: CPT | Mod: CR,S$GLB,, | Performed by: FAMILY MEDICINE

## 2021-09-09 PROCEDURE — 99499 NO LOS: ICD-10-PCS | Mod: CR,S$GLB,, | Performed by: FAMILY MEDICINE

## 2021-10-07 LAB
LEFT EYE DM RETINOPATHY: NEGATIVE
RIGHT EYE DM RETINOPATHY: NEGATIVE

## 2021-10-14 ENCOUNTER — PATIENT OUTREACH (OUTPATIENT)
Dept: ADMINISTRATIVE | Facility: HOSPITAL | Age: 76
End: 2021-10-14

## 2021-12-09 ENCOUNTER — OFFICE VISIT (OUTPATIENT)
Dept: FAMILY MEDICINE | Facility: CLINIC | Age: 76
End: 2021-12-09
Payer: MEDICARE

## 2021-12-09 VITALS
HEART RATE: 50 BPM | HEIGHT: 71 IN | OXYGEN SATURATION: 98 % | SYSTOLIC BLOOD PRESSURE: 136 MMHG | DIASTOLIC BLOOD PRESSURE: 84 MMHG | TEMPERATURE: 99 F | BODY MASS INDEX: 34.55 KG/M2 | WEIGHT: 246.81 LBS

## 2021-12-09 DIAGNOSIS — K63.5 POLYP OF COLON, UNSPECIFIED PART OF COLON, UNSPECIFIED TYPE: ICD-10-CM

## 2021-12-09 DIAGNOSIS — E78.00 PURE HYPERCHOLESTEROLEMIA: ICD-10-CM

## 2021-12-09 DIAGNOSIS — G25.81 RLS (RESTLESS LEGS SYNDROME): ICD-10-CM

## 2021-12-09 DIAGNOSIS — Z79.899 ON LONG TERM DRUG THERAPY: ICD-10-CM

## 2021-12-09 DIAGNOSIS — R91.1 SOLITARY PULMONARY NODULE: ICD-10-CM

## 2021-12-09 DIAGNOSIS — F41.9 ANXIETY: ICD-10-CM

## 2021-12-09 DIAGNOSIS — G60.9 IDIOPATHIC PERIPHERAL NEUROPATHY: ICD-10-CM

## 2021-12-09 DIAGNOSIS — E11.40 TYPE 2 DIABETES MELLITUS WITH DIABETIC NEUROPATHY, WITHOUT LONG-TERM CURRENT USE OF INSULIN: Primary | ICD-10-CM

## 2021-12-09 DIAGNOSIS — I49.9 IRREGULAR HEART RATE: ICD-10-CM

## 2021-12-09 DIAGNOSIS — I10 ESSENTIAL HYPERTENSION: ICD-10-CM

## 2021-12-09 DIAGNOSIS — J98.59 MEDIASTINAL MASS: ICD-10-CM

## 2021-12-09 LAB — GLUCOSE SERPL-MCNC: 143 MG/DL (ref 70–110)

## 2021-12-09 PROCEDURE — 93000 ELECTROCARDIOGRAM COMPLETE: CPT | Mod: S$GLB,,, | Performed by: FAMILY MEDICINE

## 2021-12-09 PROCEDURE — 93000 PR ELECTROCARDIOGRAM, COMPLETE: ICD-10-PCS | Mod: S$GLB,,, | Performed by: FAMILY MEDICINE

## 2021-12-09 PROCEDURE — 82962 GLUCOSE BLOOD TEST: CPT | Mod: ,,, | Performed by: FAMILY MEDICINE

## 2021-12-09 PROCEDURE — 99215 OFFICE O/P EST HI 40 MIN: CPT | Mod: 25,S$GLB,, | Performed by: FAMILY MEDICINE

## 2021-12-09 PROCEDURE — 99215 PR OFFICE/OUTPT VISIT, EST, LEVL V, 40-54 MIN: ICD-10-PCS | Mod: 25,S$GLB,, | Performed by: FAMILY MEDICINE

## 2021-12-09 PROCEDURE — 82962 POCT GLUCOSE, HAND-HELD DEVICE: ICD-10-PCS | Mod: ,,, | Performed by: FAMILY MEDICINE

## 2021-12-09 RX ORDER — PREGABALIN 150 MG/1
150 CAPSULE ORAL 2 TIMES DAILY
Qty: 180 CAPSULE | Refills: 1 | Status: SHIPPED | OUTPATIENT
Start: 2021-12-09 | End: 2022-06-09 | Stop reason: SDUPTHER

## 2021-12-09 RX ORDER — ONDANSETRON 4 MG/1
4 TABLET, ORALLY DISINTEGRATING ORAL EVERY 8 HOURS PRN
COMMUNITY
Start: 2021-06-17

## 2021-12-09 RX ORDER — FLUOXETINE HYDROCHLORIDE 20 MG/1
60 CAPSULE ORAL DAILY
Qty: 270 CAPSULE | Refills: 1 | Status: SHIPPED | OUTPATIENT
Start: 2021-12-09 | End: 2022-07-14 | Stop reason: SDUPTHER

## 2021-12-09 RX ORDER — AMLODIPINE BESYLATE 5 MG/1
5 TABLET ORAL DAILY
Qty: 90 TABLET | Refills: 1 | Status: SHIPPED | OUTPATIENT
Start: 2021-12-09 | End: 2022-06-09 | Stop reason: SDUPTHER

## 2021-12-09 RX ORDER — ROPINIROLE 1 MG/1
1 TABLET, FILM COATED ORAL NIGHTLY
Qty: 90 TABLET | Refills: 3 | Status: SHIPPED | OUTPATIENT
Start: 2021-12-09 | End: 2022-06-09 | Stop reason: SDUPTHER

## 2021-12-09 RX ORDER — GLIMEPIRIDE 1 MG/1
1 TABLET ORAL DAILY
Qty: 90 TABLET | Refills: 3 | Status: SHIPPED | OUTPATIENT
Start: 2021-12-09 | End: 2022-06-09 | Stop reason: SDUPTHER

## 2021-12-09 RX ORDER — PRAVASTATIN SODIUM 40 MG/1
40 TABLET ORAL DAILY
Qty: 90 TABLET | Refills: 3 | Status: SHIPPED | OUTPATIENT
Start: 2021-12-09 | End: 2022-06-09 | Stop reason: SDUPTHER

## 2021-12-09 RX ORDER — FLUOXETINE HYDROCHLORIDE 20 MG/1
60 CAPSULE ORAL DAILY
Qty: 270 CAPSULE | Refills: 1 | Status: SHIPPED | OUTPATIENT
Start: 2021-12-09 | End: 2021-12-09 | Stop reason: SDUPTHER

## 2021-12-10 LAB
ABS NRBC COUNT: 0 X 10 3/UL (ref 0–0.01)
ABSOLUTE BASOPHIL: 0.05 X 10 3/UL (ref 0–0.22)
ABSOLUTE EOSINOPHIL: 0.11 X 10 3/UL (ref 0.04–0.54)
ABSOLUTE IMMATURE GRAN: 0.02 X 10 3/UL (ref 0–0.04)
ABSOLUTE LYMPHOCYTE: 2.72 X 10 3/UL (ref 0.86–4.75)
ABSOLUTE MONOCYTE: 0.68 X 10 3/UL (ref 0.22–1.08)
ALBUMIN SERPL-MCNC: 4.9 G/DL (ref 3.5–5.2)
ALBUMIN/GLOB SERPL ELPH: 2 {RATIO} (ref 1–2.7)
ALP ISOS SERPL LEV INH-CCNC: 97 U/L (ref 40–130)
ALT (SGPT): 21 U/L (ref 0–41)
ANION GAP SERPL CALC-SCNC: 9 MMOL/L (ref 8–17)
AST SERPL-CCNC: 19 U/L (ref 0–40)
BASOPHILS NFR BLD: 0.8 % (ref 0.2–1.2)
BILIRUBIN, TOTAL: 0.53 MG/DL (ref 0–1.2)
BUN/CREAT SERPL: 15.4 (ref 6–20)
CALCIUM SERPL-MCNC: 9.4 MG/DL (ref 8.6–10.2)
CARBON DIOXIDE, CO2: 26 MMOL/L (ref 22–29)
CHLORIDE: 104 MMOL/L (ref 98–107)
CHOLEST SERPL-MSCNC: 163 MG/DL (ref 100–200)
CREAT SERPL-MCNC: 1.14 MG/DL (ref 0.7–1.2)
CREATININE RANDOM URINE: 97.7 MG/DL (ref 39–259)
EOSINOPHIL NFR BLD: 1.7 % (ref 0.7–7)
ESTIMATED AVERAGE GLUCOSE: 140 MG/DL
GFR ESTIMATION: 62.79
GLOBULIN: 2.4 G/DL (ref 1.5–4.5)
GLUCOSE: 86 MG/DL (ref 82–115)
HBA1C MFR BLD: 6.5 % (ref 4–6)
HCT VFR BLD AUTO: 42.7 % (ref 42–52)
HDLC SERPL-MCNC: 32 MG/DL
HGB BLD-MCNC: 14.4 G/DL (ref 14–18)
IMMATURE GRANULOCYTES: 0.3 % (ref 0–0.5)
LDL/HDL RATIO: 2.5 (ref 1–3)
LDLC SERPL CALC-MCNC: 79.8 MG/DL (ref 0–100)
LYMPHOCYTES NFR BLD: 40.9 % (ref 19.3–53.1)
MCH RBC QN AUTO: 30 PG (ref 27–32)
MCHC RBC AUTO-ENTMCNC: 33.7 G/DL (ref 32–36)
MCV RBC AUTO: 89 FL (ref 80–94)
MICROALBUMIN QUANT: 1.6 MG/DL (ref 0–2)
MICROALBUMIN/CREATININE RATIO: 16.38 UG/MG (ref 0–30)
MONOCYTES NFR BLD: 10.2 % (ref 4.7–12.5)
NEUTROPHILS # BLD AUTO: 3.07 X 10 3/UL (ref 2.15–7.56)
NEUTROPHILS NFR BLD: 46.1 % (ref 34–71.1)
NUCLEATED RED BLOOD CELLS: 0 /100 WBC (ref 0–0.2)
PLATELET # BLD AUTO: 264 X 10 3/UL (ref 135–400)
POTASSIUM: 4.4 MMOL/L (ref 3.5–5.1)
PROT SNV-MCNC: 7.3 G/DL (ref 6.4–8.3)
RBC # BLD AUTO: 4.8 X 10 6/UL (ref 4.7–6.1)
RDW-SD: 41.2 FL (ref 37–54)
SODIUM: 139 MMOL/L (ref 136–145)
T4, FREE: 1.3 NG/DL (ref 0.93–1.7)
TRIGL SERPL-MCNC: 256 MG/DL (ref 0–150)
TSH SERPL DL<=0.005 MIU/L-ACNC: 3.11 UIU/ML (ref 0.27–4.2)
UREA NITROGEN (BUN): 17.5 MG/DL (ref 8–23)
WBC # BLD: 6.65 X 10 3/UL (ref 4.3–10.8)

## 2021-12-17 ENCOUNTER — TELEPHONE (OUTPATIENT)
Dept: FAMILY MEDICINE | Facility: CLINIC | Age: 76
End: 2021-12-17
Payer: MEDICARE

## 2022-04-18 LAB
BUN SERPL-MCNC: 22 MG/DL (ref 7–20)
CALCIUM SERPL-MCNC: 8.8 MG/DL (ref 8.1–10.3)
CHLORIDE SERPL-SCNC: 104 MMOL/L (ref 98–107)
CHOLEST SERPL-MSCNC: 164 MG/DL (ref 100–200)
CO2 SERPL-SCNC: 26 MMOL/L (ref 23–32)
CREAT SERPL-MCNC: 1.3 MG/DL (ref 0.7–1.2)
GFR ESTIMATION: 57
GLUCOSE SERPL-MCNC: 137 MG/DL (ref 70–110)
HBA1C MFR BLD: 6.4 % (ref 4.2–5.8)
HDLC SERPL-MCNC: 35 MG/DL (ref 29–71)
LDLC SERPL CALC-MCNC: 104 MG/DL (ref 0–100)
POTASSIUM SERPL-SCNC: 3.8 MMOL/L (ref 3.4–5)
SODIUM BLD-SCNC: 139 MMOL/L (ref 135–145)
TRIGL SERPL-MCNC: 185 MG/DL

## 2022-06-07 ENCOUNTER — PATIENT OUTREACH (OUTPATIENT)
Dept: ADMINISTRATIVE | Facility: HOSPITAL | Age: 77
End: 2022-06-07
Payer: MEDICARE

## 2022-06-07 NOTE — PROGRESS NOTES
Pt supposed to have f/u colonoscopy last year but was put on hold d/t having another surgery. Procedure has not been scheduled at this time.

## 2022-06-09 ENCOUNTER — OFFICE VISIT (OUTPATIENT)
Dept: FAMILY MEDICINE | Facility: CLINIC | Age: 77
End: 2022-06-09
Payer: MEDICARE

## 2022-06-09 VITALS
BODY MASS INDEX: 35.61 KG/M2 | HEIGHT: 71 IN | OXYGEN SATURATION: 98 % | SYSTOLIC BLOOD PRESSURE: 124 MMHG | RESPIRATION RATE: 20 BRPM | TEMPERATURE: 98 F | WEIGHT: 254.38 LBS | HEART RATE: 70 BPM | DIASTOLIC BLOOD PRESSURE: 80 MMHG

## 2022-06-09 DIAGNOSIS — G25.81 RLS (RESTLESS LEGS SYNDROME): ICD-10-CM

## 2022-06-09 DIAGNOSIS — G60.9 IDIOPATHIC PERIPHERAL NEUROPATHY: ICD-10-CM

## 2022-06-09 DIAGNOSIS — E66.01 SEVERE OBESITY (BMI 35.0-39.9) WITH COMORBIDITY: ICD-10-CM

## 2022-06-09 DIAGNOSIS — E11.40 TYPE 2 DIABETES MELLITUS WITH DIABETIC NEUROPATHY, WITHOUT LONG-TERM CURRENT USE OF INSULIN: ICD-10-CM

## 2022-06-09 DIAGNOSIS — Z00.00 WELLNESS EXAMINATION: Primary | ICD-10-CM

## 2022-06-09 DIAGNOSIS — I10 ESSENTIAL HYPERTENSION: ICD-10-CM

## 2022-06-09 DIAGNOSIS — K63.5 POLYP OF COLON, UNSPECIFIED PART OF COLON, UNSPECIFIED TYPE: ICD-10-CM

## 2022-06-09 DIAGNOSIS — E78.00 PURE HYPERCHOLESTEROLEMIA: ICD-10-CM

## 2022-06-09 LAB — GLUCOSE SERPL-MCNC: 194 MG/DL (ref 70–110)

## 2022-06-09 PROCEDURE — 99397 PER PM REEVAL EST PAT 65+ YR: CPT | Mod: GY,S$GLB,, | Performed by: FAMILY MEDICINE

## 2022-06-09 PROCEDURE — 82962 GLUCOSE BLOOD TEST: CPT | Mod: ,,, | Performed by: FAMILY MEDICINE

## 2022-06-09 PROCEDURE — 99397 PR PREVENTIVE VISIT,EST,65 & OVER: ICD-10-PCS | Mod: GY,S$GLB,, | Performed by: FAMILY MEDICINE

## 2022-06-09 PROCEDURE — 82962 POCT GLUCOSE, HAND-HELD DEVICE: ICD-10-PCS | Mod: ,,, | Performed by: FAMILY MEDICINE

## 2022-06-09 RX ORDER — PREGABALIN 150 MG/1
150 CAPSULE ORAL 2 TIMES DAILY
Qty: 180 CAPSULE | Refills: 1 | Status: SHIPPED | OUTPATIENT
Start: 2022-06-09 | End: 2023-01-23

## 2022-06-09 RX ORDER — ROPINIROLE 1 MG/1
1 TABLET, FILM COATED ORAL NIGHTLY
Qty: 90 TABLET | Refills: 3 | Status: SHIPPED | OUTPATIENT
Start: 2022-06-09 | End: 2023-06-27

## 2022-06-09 RX ORDER — PRAVASTATIN SODIUM 40 MG/1
40 TABLET ORAL DAILY
Qty: 90 TABLET | Refills: 3 | Status: SHIPPED | OUTPATIENT
Start: 2022-06-09 | End: 2023-05-01

## 2022-06-09 RX ORDER — GLIMEPIRIDE 1 MG/1
1 TABLET ORAL DAILY
Qty: 90 TABLET | Refills: 3 | Status: SHIPPED | OUTPATIENT
Start: 2022-06-09

## 2022-06-09 RX ORDER — AMLODIPINE BESYLATE 5 MG/1
5 TABLET ORAL DAILY
Qty: 90 TABLET | Refills: 1 | Status: SHIPPED | OUTPATIENT
Start: 2022-06-09 | End: 2022-10-31

## 2022-06-09 RX ORDER — INSULIN PUMP SYRINGE, 3 ML
EACH MISCELLANEOUS
Qty: 1 EACH | Refills: 0 | Status: SHIPPED | OUTPATIENT
Start: 2022-06-09 | End: 2023-06-09

## 2022-06-09 RX ORDER — INSULIN PUMP SYRINGE, 3 ML
EACH MISCELLANEOUS
Qty: 1 EACH | Refills: 0 | Status: SHIPPED | OUTPATIENT
Start: 2022-06-09 | End: 2022-06-09

## 2022-06-09 NOTE — PROGRESS NOTES
Subjective:      Patient ID: Maurice Marinelli is a 77 y.o. male.    Chief Complaint: Follow-up      HPI:  77-year-old male who presents for wellness exam.  Reports compliance with meds.  Does not have a glucometer or glucose strips at home.  [facility]  He is due for colonoscopy.    Past Medical History:   Diagnosis Date    Anxiety     Depression     Diabetic neuropathy     Hyperlipidemia     Hypertension      Past Surgical History:   Procedure Laterality Date    ROTATOR CUFF REPAIR       Family History   Problem Relation Age of Onset    Hypertension Mother     Heart disease Father      Social History     Socioeconomic History    Marital status:    Tobacco Use    Smoking status: Passive Smoke Exposure - Never Smoker    Smokeless tobacco: Never Used   Substance and Sexual Activity    Alcohol use: Not Currently    Drug use: Never     Review of patient's allergies indicates:   Allergen Reactions    Lisinopril     Quinapril Other (See Comments)       Review of Systems   Constitutional: Negative for activity change, appetite change, chills, fatigue, fever and unexpected weight change.   HENT: Negative for congestion, ear pain, rhinorrhea, sinus pressure, sinus pain, sneezing, sore throat and trouble swallowing.    Eyes: Negative for photophobia, pain and itching.   Respiratory: Negative for cough, chest tightness, shortness of breath and wheezing.    Cardiovascular: Negative for chest pain, palpitations and leg swelling.   Gastrointestinal: Negative for abdominal distention, abdominal pain, constipation, diarrhea, nausea and vomiting.   Endocrine: Negative for cold intolerance, heat intolerance, polydipsia and polyphagia.   Genitourinary: Negative for difficulty urinating, dysuria and frequency.   Musculoskeletal: Negative for arthralgias, joint swelling and myalgias.   Skin: Negative for pallor and rash.   Neurological: Negative for dizziness, seizures, syncope, speech  "difficulty and headaches.   Hematological: Negative for adenopathy. Does not bruise/bleed easily.   Psychiatric/Behavioral: Negative for agitation, behavioral problems and hallucinations.       Objective:       /80   Pulse 70   Temp 98 °F (36.7 °C)   Resp 20   Ht 5' 11" (1.803 m)   Wt 115.4 kg (254 lb 6.4 oz)   SpO2 98%   BMI 35.48 kg/m²   Physical Exam  Vitals and nursing note reviewed.   Constitutional:       Appearance: He is well-developed.   HENT:      Head: Normocephalic and atraumatic.      Nose: Nose normal.   Eyes:      Conjunctiva/sclera: Conjunctivae normal.      Pupils: Pupils are equal, round, and reactive to light.   Cardiovascular:      Rate and Rhythm: Normal rate and regular rhythm.      Heart sounds: Normal heart sounds.   Pulmonary:      Effort: Pulmonary effort is normal.      Breath sounds: Normal breath sounds.   Abdominal:      Palpations: Abdomen is soft.      Tenderness: There is no abdominal tenderness.   Musculoskeletal:         General: Normal range of motion.      Cervical back: Normal range of motion and neck supple.   Skin:     General: Skin is warm and dry.   Neurological:      Mental Status: He is alert and oriented to person, place, and time.   Psychiatric:         Behavior: Behavior normal.         Thought Content: Thought content normal.         Judgment: Judgment normal.         Assessment:     1. Wellness examination    2. Type 2 diabetes mellitus with diabetic neuropathy, without long-term current use of insulin    3. Severe obesity (BMI 35.0-39.9) with comorbidity    4. Polyp of colon, unspecified part of colon, unspecified type    5. Essential hypertension    6. Pure hypercholesterolemia    7. Idiopathic peripheral neuropathy    8. RLS (restless legs syndrome)        Plan:   Wellness examination    Type 2 diabetes mellitus with diabetic neuropathy, without long-term current use of insulin  -     POCT Glucose, Hand-Held Device  -     pravastatin (PRAVACHOL) 40 MG " tablet; Take 1 tablet (40 mg total) by mouth once daily.  Dispense: 90 tablet; Refill: 3  -     glimepiride (AMARYL) 1 MG tablet; Take 1 tablet (1 mg total) by mouth once daily.  Dispense: 90 tablet; Refill: 3  -     blood sugar diagnostic Strp; True metrix strips for once daily glucose checks.  Dispense: 100 strip; Refill: 3  -     blood-glucose meter kit; True metrix glucometer for daily glucose checks.  Dispense: 1 each; Refill: 0    Severe obesity (BMI 35.0-39.9) with comorbidity    Polyp of colon, unspecified part of colon, unspecified type  -     Ambulatory referral/consult to Gastroenterology; Future; Expected date: 06/16/2022    Essential hypertension  -     amLODIPine (NORVASC) 5 MG tablet; Take 1 tablet (5 mg total) by mouth once daily.  Dispense: 90 tablet; Refill: 1    Pure hypercholesterolemia  -     pravastatin (PRAVACHOL) 40 MG tablet; Take 1 tablet (40 mg total) by mouth once daily.  Dispense: 90 tablet; Refill: 3    Idiopathic peripheral neuropathy  -     pregabalin (LYRICA) 150 MG capsule; Take 1 capsule (150 mg total) by mouth 2 (two) times daily.  Dispense: 180 capsule; Refill: 1    RLS (restless legs syndrome)  -     rOPINIRole (REQUIP) 1 MG tablet; Take 1 tablet (1 mg total) by mouth nightly.  Dispense: 90 tablet; Refill: 3    Other orders  -     Discontinue: blood sugar diagnostic Strp; Use for once daily glucose checks.  Dispense: 100 strip; Refill: 3  -     Discontinue: blood-glucose meter kit; Goodreads glucometer for daily glucose checks.  Dispense: 1 each; Refill: 0      Reviewed CT with patient.    Refill meds.    Order glucometer.    Bring in recent lab results.    Follow-up in 3 months.  Sooner if needed.    Monitor glucose at home.  \  Diet and exercise    Medication List with Changes/Refills   New Medications    BLOOD SUGAR DIAGNOSTIC STRP    True metrix strips for once daily glucose checks.    BLOOD-GLUCOSE METER KIT    True metrix glucometer for daily glucose checks.    Current Medications    ASPIRIN 81 MG CHEW    aspirin 81 mg chewable tablet   Chew 1 tablet every day by oral route.    BD ALCOHOL SWABS PADM    USE WHEN CHECKING BLOOD SUGAR ONE TIME DAILY    CHOLECALCIFEROL, VITAMIN D3, (VITAMIN D3) 50 MCG (2,000 UNIT) CAP    TAKE 1 CAPSULE EVERY DAY    FLUOXETINE 20 MG CAPSULE    Take 3 capsules (60 mg total) by mouth once daily.    MELATONIN 10 MG TAB    Take 1 tablet by mouth nightly as needed.    ONDANSETRON (ZOFRAN-ODT) 4 MG TBDL    Take 4 mg by mouth every 8 (eight) hours as needed.   Changed and/or Refilled Medications    Modified Medication Previous Medication    AMLODIPINE (NORVASC) 5 MG TABLET amLODIPine (NORVASC) 5 MG tablet       Take 1 tablet (5 mg total) by mouth once daily.    Take 1 tablet (5 mg total) by mouth once daily.    GLIMEPIRIDE (AMARYL) 1 MG TABLET glimepiride (AMARYL) 1 MG tablet       Take 1 tablet (1 mg total) by mouth once daily.    Take 1 tablet (1 mg total) by mouth once daily.    PRAVASTATIN (PRAVACHOL) 40 MG TABLET pravastatin (PRAVACHOL) 40 MG tablet       Take 1 tablet (40 mg total) by mouth once daily.    Take 1 tablet (40 mg total) by mouth once daily.    PREGABALIN (LYRICA) 150 MG CAPSULE pregabalin (LYRICA) 150 MG capsule       Take 1 capsule (150 mg total) by mouth 2 (two) times daily.    Take 1 capsule (150 mg total) by mouth 2 (two) times daily.    ROPINIROLE (REQUIP) 1 MG TABLET rOPINIRole (REQUIP) 1 MG tablet       Take 1 tablet (1 mg total) by mouth nightly.    Take 1 tablet (1 mg total) by mouth nightly.            Disclaimer: This note may have been prepared using voice recognition software, it may have not been extensively proofed, as such there could be errors within the text such as sound alike errors.

## 2022-07-14 DIAGNOSIS — F41.9 ANXIETY: ICD-10-CM

## 2022-07-14 RX ORDER — FLUOXETINE HYDROCHLORIDE 20 MG/1
60 CAPSULE ORAL DAILY
Qty: 270 CAPSULE | Refills: 1 | Status: SHIPPED | OUTPATIENT
Start: 2022-07-14 | End: 2022-11-21

## 2022-09-09 LAB — CRC RECOMMENDATION EXT: NORMAL

## 2022-09-20 PROBLEM — K20.90 ESOPHAGITIS: Status: ACTIVE | Noted: 2022-09-20

## 2022-09-20 PROBLEM — K29.70 GASTRITIS: Status: ACTIVE | Noted: 2022-09-20

## 2022-09-28 ENCOUNTER — PATIENT OUTREACH (OUTPATIENT)
Dept: ADMINISTRATIVE | Facility: HOSPITAL | Age: 77
End: 2022-09-28
Payer: MEDICARE

## 2022-09-28 PROBLEM — K57.90 DIVERTICULOSIS: Status: ACTIVE | Noted: 2022-09-09

## 2022-09-28 NOTE — LETTER
AUTHORIZATION FOR RELEASE OF   CONFIDENTIAL INFORMATION        We are seeing Maurice Marinelli, date of birth 1945, in the clinic at [facility]. Rakesh Madison MD is the patient's PCP. Maurice Marinelli has an outstanding lab/procedure at the time we reviewed his chart. In order to help keep his health information updated, he has authorized us to request the following medical record(s):        (  )  MAMMOGRAM                                      ( x )  COLONOSCOPY 2022 - received  (*Pathology report needed)      (  )  PAP SMEAR                                          (  )  OUTSIDE LAB RESULTS     (  )  DEXA SCAN                                          (  )  EYE EXAM            (  )  FOOT EXAM                                          (  )  ENTIRE RECORD     (  )  OUTSIDE IMMUNIZATIONS                 (  )  _______________         Please fax records to [facility], Clint N Lincoln, MD, [phone]     If you have any questions, please contact    JIMMIE Laguna at [phone]        Patient Name: Maurice Marinelli  : 1945  Patient Phone #: [phone]

## 2022-09-28 NOTE — PROGRESS NOTES
Manually uploaded and hyper-linked COLONOSCOPY 9/9/2022    SHERI faxed to Dr. Kun bond for colon pathology results. Remind me set 1 week.

## 2022-09-30 PROBLEM — A04.8 H. PYLORI INFECTION: Status: ACTIVE | Noted: 2022-09-30

## 2022-10-05 NOTE — PROGRESS NOTES
2nd attempt  SHERI faxed to Dr. Kun Villela 1x for colon pathology results. Remind me set 1 week.

## 2022-10-21 ENCOUNTER — PATIENT OUTREACH (OUTPATIENT)
Dept: ADMINISTRATIVE | Facility: HOSPITAL | Age: 77
End: 2022-10-21
Payer: MEDICARE

## 2022-10-21 NOTE — PROGRESS NOTES
Working A1C gap report. Uploading and hyper-linking Hemoglobin A1C, BMP and Lipid panel done 4.18.2022.

## 2023-05-01 DIAGNOSIS — E78.00 PURE HYPERCHOLESTEROLEMIA: ICD-10-CM

## 2023-05-01 DIAGNOSIS — E11.40 TYPE 2 DIABETES MELLITUS WITH DIABETIC NEUROPATHY, WITHOUT LONG-TERM CURRENT USE OF INSULIN: ICD-10-CM

## 2023-05-01 RX ORDER — PRAVASTATIN SODIUM 40 MG/1
TABLET ORAL
Qty: 90 TABLET | Refills: 0 | Status: SHIPPED | OUTPATIENT
Start: 2023-05-01

## 2023-05-12 DIAGNOSIS — E11.40 TYPE 2 DIABETES MELLITUS WITH DIABETIC NEUROPATHY, WITHOUT LONG-TERM CURRENT USE OF INSULIN: ICD-10-CM

## 2023-05-12 RX ORDER — GLIMEPIRIDE 1 MG/1
TABLET ORAL
Qty: 90 TABLET | Refills: 3 | OUTPATIENT
Start: 2023-05-12

## 2023-06-21 DIAGNOSIS — I10 ESSENTIAL HYPERTENSION: ICD-10-CM

## 2023-06-21 RX ORDER — AMLODIPINE BESYLATE 5 MG/1
TABLET ORAL
Qty: 90 TABLET | Refills: 1 | Status: SHIPPED | OUTPATIENT
Start: 2023-06-21

## 2023-06-27 DIAGNOSIS — G25.81 RLS (RESTLESS LEGS SYNDROME): ICD-10-CM

## 2023-06-27 RX ORDER — ROPINIROLE 1 MG/1
TABLET, FILM COATED ORAL
Qty: 90 TABLET | Refills: 3 | Status: SHIPPED | OUTPATIENT
Start: 2023-06-27

## 2023-09-06 ENCOUNTER — PATIENT OUTREACH (OUTPATIENT)
Dept: ADMINISTRATIVE | Facility: HOSPITAL | Age: 78
End: 2023-09-06
Payer: MEDICARE

## 2023-09-06 NOTE — PROGRESS NOTES
HTN Report: Tried calling patient, someone answered. Could hear back ground noises and TV. No one responded to me, phone then disconnected.

## 2023-09-22 ENCOUNTER — PATIENT OUTREACH (OUTPATIENT)
Dept: ADMINISTRATIVE | Facility: HOSPITAL | Age: 78
End: 2023-09-22
Payer: MEDICARE

## 2023-09-22 NOTE — PROGRESS NOTES
Attempted to contact patient by phone for PCP visit, was able to speak to patient. Patient now is in [place], and [facility].